# Patient Record
Sex: MALE | Race: WHITE | NOT HISPANIC OR LATINO | Employment: OTHER | URBAN - METROPOLITAN AREA
[De-identification: names, ages, dates, MRNs, and addresses within clinical notes are randomized per-mention and may not be internally consistent; named-entity substitution may affect disease eponyms.]

---

## 2018-04-06 ENCOUNTER — RESOLUTE PROFESSIONAL BILLING HOSPITAL PROF FEE (OUTPATIENT)
Dept: HOSPITALIST | Facility: MEDICAL CENTER | Age: 53
End: 2018-04-06
Payer: COMMERCIAL

## 2018-04-06 ENCOUNTER — APPOINTMENT (OUTPATIENT)
Dept: RADIOLOGY | Facility: MEDICAL CENTER | Age: 53
DRG: 339 | End: 2018-04-06
Attending: EMERGENCY MEDICINE
Payer: COMMERCIAL

## 2018-04-06 ENCOUNTER — HOSPITAL ENCOUNTER (INPATIENT)
Facility: MEDICAL CENTER | Age: 53
LOS: 4 days | DRG: 339 | End: 2018-04-10
Attending: EMERGENCY MEDICINE | Admitting: SURGERY
Payer: COMMERCIAL

## 2018-04-06 DIAGNOSIS — K35.32 ACUTE GANGRENOUS APPENDICITIS WITH PERFORATION AND PERITONITIS: ICD-10-CM

## 2018-04-06 DIAGNOSIS — K35.32 PERFORATED APPENDICITIS: ICD-10-CM

## 2018-04-06 PROBLEM — I48.91 ATRIAL FIBRILLATION WITH RAPID VENTRICULAR RESPONSE (HCC): Status: ACTIVE | Noted: 2018-04-06

## 2018-04-06 PROBLEM — Z86.79 HISTORY OF HYPERTENSION: Status: ACTIVE | Noted: 2018-04-06

## 2018-04-06 LAB
ALBUMIN SERPL BCP-MCNC: 4.2 G/DL (ref 3.2–4.9)
ALBUMIN/GLOB SERPL: 1.4 G/DL
ALP SERPL-CCNC: 81 U/L (ref 30–99)
ALT SERPL-CCNC: 15 U/L (ref 2–50)
ANION GAP SERPL CALC-SCNC: 12 MMOL/L (ref 0–11.9)
ANION GAP SERPL CALC-SCNC: 13 MMOL/L (ref 0–11.9)
APPEARANCE UR: CLEAR
AST SERPL-CCNC: 15 U/L (ref 12–45)
BASOPHILS # BLD AUTO: 0.5 % (ref 0–1.8)
BASOPHILS # BLD: 0.07 K/UL (ref 0–0.12)
BILIRUB SERPL-MCNC: 1.3 MG/DL (ref 0.1–1.5)
BILIRUB UR QL STRIP.AUTO: NEGATIVE
BUN SERPL-MCNC: 15 MG/DL (ref 8–22)
BUN SERPL-MCNC: 16 MG/DL (ref 8–22)
CALCIUM SERPL-MCNC: 7.5 MG/DL (ref 8.5–10.5)
CALCIUM SERPL-MCNC: 8.8 MG/DL (ref 8.5–10.5)
CHLORIDE SERPL-SCNC: 104 MMOL/L (ref 96–112)
CHLORIDE SERPL-SCNC: 99 MMOL/L (ref 96–112)
CO2 SERPL-SCNC: 20 MMOL/L (ref 20–33)
CO2 SERPL-SCNC: 21 MMOL/L (ref 20–33)
COLOR UR: YELLOW
CREAT SERPL-MCNC: 1.18 MG/DL (ref 0.5–1.4)
CREAT SERPL-MCNC: 1.25 MG/DL (ref 0.5–1.4)
EKG IMPRESSION: NORMAL
EOSINOPHIL # BLD AUTO: 0 K/UL (ref 0–0.51)
EOSINOPHIL NFR BLD: 0 % (ref 0–6.9)
ERYTHROCYTE [DISTWIDTH] IN BLOOD BY AUTOMATED COUNT: 36.3 FL (ref 35.9–50)
GLOBULIN SER CALC-MCNC: 3 G/DL (ref 1.9–3.5)
GLUCOSE BLD-MCNC: 173 MG/DL (ref 65–99)
GLUCOSE SERPL-MCNC: 142 MG/DL (ref 65–99)
GLUCOSE SERPL-MCNC: 151 MG/DL (ref 65–99)
GLUCOSE UR STRIP.AUTO-MCNC: NEGATIVE MG/DL
HCT VFR BLD AUTO: 45.4 % (ref 42–52)
HGB BLD-MCNC: 16.3 G/DL (ref 14–18)
IMM GRANULOCYTES # BLD AUTO: 0.09 K/UL (ref 0–0.11)
IMM GRANULOCYTES NFR BLD AUTO: 0.6 % (ref 0–0.9)
KETONES UR STRIP.AUTO-MCNC: 40 MG/DL
LACTATE BLD-SCNC: 1.5 MMOL/L (ref 0.5–2)
LEUKOCYTE ESTERASE UR QL STRIP.AUTO: NEGATIVE
LYMPHOCYTES # BLD AUTO: 0.82 K/UL (ref 1–4.8)
LYMPHOCYTES NFR BLD: 5.8 % (ref 22–41)
MAGNESIUM SERPL-MCNC: 1.7 MG/DL (ref 1.5–2.5)
MCH RBC QN AUTO: 30.4 PG (ref 27–33)
MCHC RBC AUTO-ENTMCNC: 35.9 G/DL (ref 33.7–35.3)
MCV RBC AUTO: 84.7 FL (ref 81.4–97.8)
MICRO URNS: ABNORMAL
MONOCYTES # BLD AUTO: 0.99 K/UL (ref 0–0.85)
MONOCYTES NFR BLD AUTO: 7 % (ref 0–13.4)
NEUTROPHILS # BLD AUTO: 12.24 K/UL (ref 1.82–7.42)
NEUTROPHILS NFR BLD: 86.1 % (ref 44–72)
NITRITE UR QL STRIP.AUTO: NEGATIVE
NRBC # BLD AUTO: 0 K/UL
NRBC BLD-RTO: 0 /100 WBC
PH UR STRIP.AUTO: 5.5 [PH]
PLATELET # BLD AUTO: 240 K/UL (ref 164–446)
PMV BLD AUTO: 8.6 FL (ref 9–12.9)
POTASSIUM SERPL-SCNC: 3.2 MMOL/L (ref 3.6–5.5)
POTASSIUM SERPL-SCNC: 4 MMOL/L (ref 3.6–5.5)
PROT SERPL-MCNC: 7.2 G/DL (ref 6–8.2)
PROT UR QL STRIP: NEGATIVE MG/DL
RBC # BLD AUTO: 5.36 M/UL (ref 4.7–6.1)
RBC UR QL AUTO: NEGATIVE
SODIUM SERPL-SCNC: 133 MMOL/L (ref 135–145)
SODIUM SERPL-SCNC: 136 MMOL/L (ref 135–145)
SP GR UR REFRACTOMETRY: >1.045
TROPONIN I SERPL-MCNC: <0.01 NG/ML (ref 0–0.04)
UROBILINOGEN UR STRIP.AUTO-MCNC: 0.2 MG/DL
WBC # BLD AUTO: 14.2 K/UL (ref 4.8–10.8)

## 2018-04-06 PROCEDURE — 0DTJ0ZZ RESECTION OF APPENDIX, OPEN APPROACH: ICD-10-PCS | Performed by: SURGERY

## 2018-04-06 PROCEDURE — 83735 ASSAY OF MAGNESIUM: CPT

## 2018-04-06 PROCEDURE — 99291 CRITICAL CARE FIRST HOUR: CPT | Performed by: SURGERY

## 2018-04-06 PROCEDURE — 500440 HCHG DRESSING, STERILE ROLL (KERLIX): Performed by: SURGERY

## 2018-04-06 PROCEDURE — 74177 CT ABD & PELVIS W/CONTRAST: CPT

## 2018-04-06 PROCEDURE — 700105 HCHG RX REV CODE 258: Performed by: SURGERY

## 2018-04-06 PROCEDURE — 700111 HCHG RX REV CODE 636 W/ 250 OVERRIDE (IP): Performed by: EMERGENCY MEDICINE

## 2018-04-06 PROCEDURE — 93010 ELECTROCARDIOGRAM REPORT: CPT | Performed by: INTERNAL MEDICINE

## 2018-04-06 PROCEDURE — 700102 HCHG RX REV CODE 250 W/ 637 OVERRIDE(OP): Performed by: SURGERY

## 2018-04-06 PROCEDURE — A9270 NON-COVERED ITEM OR SERVICE: HCPCS | Performed by: SURGERY

## 2018-04-06 PROCEDURE — 160039 HCHG SURGERY MINUTES - EA ADDL 1 MIN LEVEL 3: Performed by: SURGERY

## 2018-04-06 PROCEDURE — 501445 HCHG STAPLER, SKIN DISP: Performed by: SURGERY

## 2018-04-06 PROCEDURE — 71045 X-RAY EXAM CHEST 1 VIEW: CPT

## 2018-04-06 PROCEDURE — 96376 TX/PRO/DX INJ SAME DRUG ADON: CPT

## 2018-04-06 PROCEDURE — 88304 TISSUE EXAM BY PATHOLOGIST: CPT

## 2018-04-06 PROCEDURE — A6402 STERILE GAUZE <= 16 SQ IN: HCPCS | Performed by: SURGERY

## 2018-04-06 PROCEDURE — 700101 HCHG RX REV CODE 250: Performed by: EMERGENCY MEDICINE

## 2018-04-06 PROCEDURE — 302135 SEQUENTIAL COMPRESSION MACHINE: Performed by: SURGERY

## 2018-04-06 PROCEDURE — 0WCP0ZZ EXTIRPATION OF MATTER FROM GASTROINTESTINAL TRACT, OPEN APPROACH: ICD-10-PCS | Performed by: SURGERY

## 2018-04-06 PROCEDURE — 700111 HCHG RX REV CODE 636 W/ 250 OVERRIDE (IP)

## 2018-04-06 PROCEDURE — 87040 BLOOD CULTURE FOR BACTERIA: CPT

## 2018-04-06 PROCEDURE — 96361 HYDRATE IV INFUSION ADD-ON: CPT

## 2018-04-06 PROCEDURE — 36415 COLL VENOUS BLD VENIPUNCTURE: CPT

## 2018-04-06 PROCEDURE — 82962 GLUCOSE BLOOD TEST: CPT

## 2018-04-06 PROCEDURE — 700101 HCHG RX REV CODE 250

## 2018-04-06 PROCEDURE — 700101 HCHG RX REV CODE 250: Performed by: SURGERY

## 2018-04-06 PROCEDURE — 80053 COMPREHEN METABOLIC PANEL: CPT

## 2018-04-06 PROCEDURE — 96374 THER/PROPH/DIAG INJ IV PUSH: CPT

## 2018-04-06 PROCEDURE — 500389 HCHG DRAIN, RESERVOIR SUCT JP 100CC: Performed by: SURGERY

## 2018-04-06 PROCEDURE — 96375 TX/PRO/DX INJ NEW DRUG ADDON: CPT

## 2018-04-06 PROCEDURE — 84484 ASSAY OF TROPONIN QUANT: CPT

## 2018-04-06 PROCEDURE — 770022 HCHG ROOM/CARE - ICU (200)

## 2018-04-06 PROCEDURE — 700111 HCHG RX REV CODE 636 W/ 250 OVERRIDE (IP): Performed by: SURGERY

## 2018-04-06 PROCEDURE — 160048 HCHG OR STATISTICAL LEVEL 1-5: Performed by: SURGERY

## 2018-04-06 PROCEDURE — 85025 COMPLETE CBC W/AUTO DIFF WBC: CPT

## 2018-04-06 PROCEDURE — 160009 HCHG ANES TIME/MIN: Performed by: SURGERY

## 2018-04-06 PROCEDURE — 501838 HCHG SUTURE GENERAL: Performed by: SURGERY

## 2018-04-06 PROCEDURE — 87086 URINE CULTURE/COLONY COUNT: CPT

## 2018-04-06 PROCEDURE — 80048 BASIC METABOLIC PNL TOTAL CA: CPT

## 2018-04-06 PROCEDURE — 83605 ASSAY OF LACTIC ACID: CPT

## 2018-04-06 PROCEDURE — 700117 HCHG RX CONTRAST REV CODE 255: Performed by: EMERGENCY MEDICINE

## 2018-04-06 PROCEDURE — 81003 URINALYSIS AUTO W/O SCOPE: CPT

## 2018-04-06 PROCEDURE — 160028 HCHG SURGERY MINUTES - 1ST 30 MINS LEVEL 3: Performed by: SURGERY

## 2018-04-06 PROCEDURE — 93005 ELECTROCARDIOGRAM TRACING: CPT | Performed by: SURGERY

## 2018-04-06 PROCEDURE — 99291 CRITICAL CARE FIRST HOUR: CPT

## 2018-04-06 PROCEDURE — 160022 HCHG BLOCK: Performed by: SURGERY

## 2018-04-06 PROCEDURE — 700105 HCHG RX REV CODE 258: Performed by: EMERGENCY MEDICINE

## 2018-04-06 RX ORDER — SODIUM CHLORIDE 9 MG/ML
1000 INJECTION, SOLUTION INTRAVENOUS ONCE
Status: COMPLETED | OUTPATIENT
Start: 2018-04-06 | End: 2018-04-06

## 2018-04-06 RX ORDER — DEXTROSE MONOHYDRATE, SODIUM CHLORIDE, AND POTASSIUM CHLORIDE 50; 2.98; 4.5 G/1000ML; G/1000ML; G/1000ML
INJECTION, SOLUTION INTRAVENOUS CONTINUOUS
Status: DISCONTINUED | OUTPATIENT
Start: 2018-04-06 | End: 2018-04-06

## 2018-04-06 RX ORDER — ONDANSETRON 2 MG/ML
4 INJECTION INTRAMUSCULAR; INTRAVENOUS ONCE
Status: COMPLETED | OUTPATIENT
Start: 2018-04-06 | End: 2018-04-06

## 2018-04-06 RX ORDER — OMEPRAZOLE 20 MG/1
20 CAPSULE, DELAYED RELEASE ORAL DAILY
Status: DISCONTINUED | OUTPATIENT
Start: 2018-04-06 | End: 2018-04-10 | Stop reason: HOSPADM

## 2018-04-06 RX ORDER — LORATADINE 10 MG/1
10 TABLET ORAL DAILY
COMMUNITY

## 2018-04-06 RX ORDER — MORPHINE SULFATE 4 MG/ML
4 INJECTION, SOLUTION INTRAMUSCULAR; INTRAVENOUS
Status: DISCONTINUED | OUTPATIENT
Start: 2018-04-06 | End: 2018-04-06

## 2018-04-06 RX ORDER — SODIUM CHLORIDE, SODIUM LACTATE, POTASSIUM CHLORIDE, CALCIUM CHLORIDE 600; 310; 30; 20 MG/100ML; MG/100ML; MG/100ML; MG/100ML
INJECTION, SOLUTION INTRAVENOUS CONTINUOUS
Status: DISCONTINUED | OUTPATIENT
Start: 2018-04-06 | End: 2018-04-08

## 2018-04-06 RX ORDER — ONDANSETRON 2 MG/ML
4 INJECTION INTRAMUSCULAR; INTRAVENOUS EVERY 6 HOURS PRN
Status: DISCONTINUED | OUTPATIENT
Start: 2018-04-06 | End: 2018-04-06

## 2018-04-06 RX ORDER — CEFTRIAXONE 2 G/1
2 INJECTION, POWDER, FOR SOLUTION INTRAMUSCULAR; INTRAVENOUS ONCE
Status: COMPLETED | OUTPATIENT
Start: 2018-04-06 | End: 2018-04-06

## 2018-04-06 RX ORDER — VALSARTAN 80 MG/1
160 TABLET ORAL DAILY
Status: DISCONTINUED | OUTPATIENT
Start: 2018-04-06 | End: 2018-04-10 | Stop reason: HOSPADM

## 2018-04-06 RX ORDER — ONDANSETRON 2 MG/ML
4 INJECTION INTRAMUSCULAR; INTRAVENOUS EVERY 4 HOURS PRN
Status: DISCONTINUED | OUTPATIENT
Start: 2018-04-06 | End: 2018-04-10 | Stop reason: HOSPADM

## 2018-04-06 RX ORDER — ACETAMINOPHEN 650 MG/1
650 SUPPOSITORY RECTAL
Status: DISCONTINUED | OUTPATIENT
Start: 2018-04-06 | End: 2018-04-06

## 2018-04-06 RX ORDER — HYDROMORPHONE HYDROCHLORIDE 2 MG/ML
.5-1 INJECTION, SOLUTION INTRAMUSCULAR; INTRAVENOUS; SUBCUTANEOUS
Status: DISCONTINUED | OUTPATIENT
Start: 2018-04-06 | End: 2018-04-07

## 2018-04-06 RX ORDER — CIPROFLOXACIN 500 MG/1
500 TABLET, FILM COATED ORAL 2 TIMES DAILY
Status: ON HOLD | COMMUNITY
Start: 2018-04-04 | End: 2018-04-10

## 2018-04-06 RX ORDER — CARVEDILOL 12.5 MG/1
6.25 TABLET ORAL 2 TIMES DAILY WITH MEALS
COMMUNITY
End: 2018-05-22

## 2018-04-06 RX ORDER — LABETALOL HYDROCHLORIDE 5 MG/ML
10-20 INJECTION, SOLUTION INTRAVENOUS EVERY 4 HOURS PRN
Status: DISCONTINUED | OUTPATIENT
Start: 2018-04-06 | End: 2018-04-10 | Stop reason: HOSPADM

## 2018-04-06 RX ORDER — OMEPRAZOLE 20 MG/1
20 CAPSULE, DELAYED RELEASE ORAL DAILY
COMMUNITY

## 2018-04-06 RX ORDER — MORPHINE SULFATE 4 MG/ML
4 INJECTION, SOLUTION INTRAMUSCULAR; INTRAVENOUS ONCE
Status: COMPLETED | OUTPATIENT
Start: 2018-04-06 | End: 2018-04-06

## 2018-04-06 RX ORDER — HYDRALAZINE HYDROCHLORIDE 20 MG/ML
10-20 INJECTION INTRAMUSCULAR; INTRAVENOUS EVERY 4 HOURS PRN
Status: DISCONTINUED | OUTPATIENT
Start: 2018-04-06 | End: 2018-04-10 | Stop reason: HOSPADM

## 2018-04-06 RX ORDER — METRONIDAZOLE 500 MG/1
500 TABLET ORAL 3 TIMES DAILY
Status: ON HOLD | COMMUNITY
Start: 2018-04-04 | End: 2018-04-10

## 2018-04-06 RX ORDER — CARVEDILOL 6.25 MG/1
6.25 TABLET ORAL 2 TIMES DAILY WITH MEALS
Status: DISCONTINUED | OUTPATIENT
Start: 2018-04-06 | End: 2018-04-10 | Stop reason: HOSPADM

## 2018-04-06 RX ORDER — DEXTROSE MONOHYDRATE 25 G/50ML
25 INJECTION, SOLUTION INTRAVENOUS
Status: DISCONTINUED | OUTPATIENT
Start: 2018-04-06 | End: 2018-04-10

## 2018-04-06 RX ORDER — VALSARTAN 160 MG/1
160 TABLET ORAL DAILY
COMMUNITY

## 2018-04-06 RX ORDER — MAGNESIUM SULFATE 1 G/100ML
1 INJECTION INTRAVENOUS ONCE
Status: COMPLETED | OUTPATIENT
Start: 2018-04-06 | End: 2018-04-06

## 2018-04-06 RX ORDER — ACETAMINOPHEN 325 MG/1
650 TABLET ORAL 4 TIMES DAILY
Status: DISCONTINUED | OUTPATIENT
Start: 2018-04-06 | End: 2018-04-09

## 2018-04-06 RX ADMIN — ONDANSETRON 4 MG: 2 INJECTION INTRAMUSCULAR; INTRAVENOUS at 06:42

## 2018-04-06 RX ADMIN — MORPHINE SULFATE 4 MG: 4 INJECTION INTRAVENOUS at 08:47

## 2018-04-06 RX ADMIN — METRONIDAZOLE 500 MG: 500 INJECTION, SOLUTION INTRAVENOUS at 16:06

## 2018-04-06 RX ADMIN — ACETAMINOPHEN 650 MG: 325 TABLET, FILM COATED ORAL at 22:53

## 2018-04-06 RX ADMIN — ACETAMINOPHEN 650 MG: 325 TABLET, FILM COATED ORAL at 16:09

## 2018-04-06 RX ADMIN — POTASSIUM CHLORIDE, DEXTROSE MONOHYDRATE AND SODIUM CHLORIDE: 300; 5; 450 INJECTION, SOLUTION INTRAVENOUS at 08:58

## 2018-04-06 RX ADMIN — SODIUM CHLORIDE 1000 ML: 9 INJECTION, SOLUTION INTRAVENOUS at 06:42

## 2018-04-06 RX ADMIN — IOHEXOL 100 ML: 350 INJECTION, SOLUTION INTRAVENOUS at 07:17

## 2018-04-06 RX ADMIN — METRONIDAZOLE 500 MG: 500 INJECTION, SOLUTION INTRAVENOUS at 22:00

## 2018-04-06 RX ADMIN — MAGNESIUM SULFATE HEPTAHYDRATE 1 G: 1 INJECTION, SOLUTION INTRAVENOUS at 18:11

## 2018-04-06 RX ADMIN — MORPHINE SULFATE 4 MG: 4 INJECTION INTRAVENOUS at 06:42

## 2018-04-06 RX ADMIN — CEFTRIAXONE SODIUM 2 G: 2 INJECTION, POWDER, FOR SOLUTION INTRAMUSCULAR; INTRAVENOUS at 07:29

## 2018-04-06 RX ADMIN — SODIUM CHLORIDE, POTASSIUM CHLORIDE, SODIUM LACTATE AND CALCIUM CHLORIDE: 600; 310; 30; 20 INJECTION, SOLUTION INTRAVENOUS at 14:49

## 2018-04-06 RX ADMIN — OMEPRAZOLE 20 MG: 20 CAPSULE, DELAYED RELEASE ORAL at 16:09

## 2018-04-06 RX ADMIN — CARVEDILOL 6.25 MG: 6.25 TABLET, FILM COATED ORAL at 18:00

## 2018-04-06 RX ADMIN — CEFTRIAXONE 2 G: 2 INJECTION, POWDER, FOR SOLUTION INTRAMUSCULAR; INTRAVENOUS at 21:00

## 2018-04-06 ASSESSMENT — LIFESTYLE VARIABLES
TOTAL SCORE: 0
EVER HAD A DRINK FIRST THING IN THE MORNING TO STEADY YOUR NERVES TO GET RID OF A HANGOVER: NO
CONSUMPTION TOTAL: INCOMPLETE
HAVE YOU EVER FELT YOU SHOULD CUT DOWN ON YOUR DRINKING: NO
TOTAL SCORE: 0
TOTAL SCORE: 0
DO YOU DRINK ALCOHOL: YES
HAVE PEOPLE ANNOYED YOU BY CRITICIZING YOUR DRINKING: NO
EVER FELT BAD OR GUILTY ABOUT YOUR DRINKING: NO

## 2018-04-06 ASSESSMENT — PAIN SCALES - GENERAL
PAINLEVEL_OUTOF10: 0

## 2018-04-06 NOTE — ED NOTES
Pt back from Ct. Rehooked to monitor- VSS. Pt medicated as ordered. See MAR for details. Dr. Hein to bedside to update pt on plan of care -- pt to go to OR for confirmed appy on Ct. Pt verbalized understanding. No needs at this time. Family at bedside. Ongoing monitoring.

## 2018-04-06 NOTE — PROGRESS NOTES
2 RN skin assessment. Surgical incision to LLQ abdomen, noted in wound LDA. No other areas of concern.

## 2018-04-06 NOTE — CONSULTS
Reason of Consult: Atrial flutter    Consulting Physician: Dr. Eng    HPI:  52-year-old male with a history of hypertension and hyperlipidemia presented with a ruptured appendix and underwent open appendectomy today. He was in sinus rhythm at arrival according to anesthesiology but developed atrial flutter with rapid ventricular response intraoperatively. The appendectomy was uncomplicated and the patient remains in atrial flutter at a rate of 140 bpm. He is hemodynamically stable and asymptomatic with regard to any cardiac complaints. He does not smoke drinks only occasionally and does not do drugs. He has no family history of coronary artery disease but does have a stepfather with atrial fibrillation so he is familiar with it. Prior to this he was in good health very active with no exertional symptoms.    Denies any other cardiovascular symptoms including chest pain, shortness of breath, dyspnea on exertion, lightheadedness, syncope or presyncope, lower extremity edema, PND, orthopnea or palpitations.      Past Medical History:   Diagnosis Date   • Allergy    • ASTHMA    • GERD (gastroesophageal reflux disease)     no ulcer/ EGD-    • Hyperlipidemia    • Hypertension        Social History     Social History   • Marital status: Single     Spouse name: N/A   • Number of children: N/A   • Years of education: N/A     Occupational History   • Not on file.     Social History Main Topics   • Smoking status: Never Smoker   • Smokeless tobacco: Never Used   • Alcohol use Yes      Comment: occ   • Drug use: No   • Sexual activity: Not on file     Other Topics Concern   • Not on file     Social History Narrative   • No narrative on file       No current facility-administered medications on file prior to encounter.      No current outpatient prescriptions on file prior to encounter.       Current Facility-Administered Medications   Medication Dose Frequency Provider Last Rate Last Dose   • Pharmacy Consult Request ...Pain  "Management Review 1 Each  1 Each PRN Kali Eng M.D.       • LR infusion   Continuous Kali Eng M.D. 150 mL/hr at 04/06/18 1449     • [START ON 4/7/2018] enoxaparin (LOVENOX) inj 40 mg  40 mg DAILY Kali Eng M.D.       • cefTRIAXone (ROCEPHIN) syringe 2 g  2 g QHS Kali Eng M.D.       • metroNIDAZOLE (FLAGYL) IVPB 500 mg  500 mg Q8HRS Kali Eng M.D.       • HYDROmorphone (DILAUDID) injection 0.5-1 mg  0.5-1 mg Q3HRS PRN Kali Eng M.D.       • ondansetron (ZOFRAN) syringe/vial injection 4 mg  4 mg Q4HRS PRN Kali Eng M.D.       • insulin regular (HUMULIN R) injection 1-6 Units  1-6 Units Q6HRS Kali Eng M.D.        And   • glucose 4 g chewable tablet 16 g  16 g Q15 MIN PRN Kali Eng M.D.        And   • dextrose 50% (D50W) injection 25 mL  25 mL Q15 MIN PRN Kali Eng M.D.       • acetaminophen (TYLENOL) tablet 650 mg  650 mg 4X/DAY Kali Eng M.D.       • hydrALAZINE (APRESOLINE) injection 10-20 mg  10-20 mg Q4HRS PRN Kali Eng M.D.       • labetalol (NORMODYNE,TRANDATE) injection 10-20 mg  10-20 mg Q4HRS PRN Kali Eng M.D.       • carvedilol (COREG) tablet 6.25 mg  6.25 mg BID WITH MEALS Kali Eng M.D.       • valsartan (DIOVAN) tablet 160 mg  160 mg DAILY Kali Eng M.D.       • omeprazole (PRILOSEC) capsule 20 mg  20 mg DAILY Kali Eng M.D.         Last reviewed on 4/6/2018  8:12 AM by Alexander Musa    Nkda [no known drug allergy]    History reviewed. No pertinent family history.    ROS: As per HPI all other systems reviewed and negative     Physical Exam   Blood pressure 115/71, pulse (!) 139, temperature 37.9 °C (100.2 °F), resp. rate 18, height 1.88 m (6' 2\"), weight 109 kg (240 lb 4.8 oz), SpO2 96 %.    Constitutional: Appears well-developed.   HENT: Normocephalic and atraumatic. No scleral icterus.   Neck: No JVD present.   Cardiovascular: Normal rate. Exam reveals no gallop " and no friction rub. No murmur heard.   Pulmonary/Chest: CTAB    Abdominal: Soft, nondistended tender to palpation. Hypoactive bowel sounds.   Musculoskeletal:  Pulses present. No atrophy. Strength normal.  Extremities: Exhibits no edema. No clubbing or cyanosis.   Skin: Skin is warm and dry.   Neuro: Non-focal, CN 2-12 intact grossly      Intake/Output Summary (Last 24 hours) at 04/06/18 1455  Last data filed at 04/06/18 1326   Gross per 24 hour   Intake              700 ml   Output               50 ml   Net              650 ml       Recent Labs      04/06/18   0631   WBC  14.2*   RBC  5.36   HEMOGLOBIN  16.3   HEMATOCRIT  45.4   MCV  84.7   MCH  30.4   MCHC  35.9*   RDW  36.3   PLATELETCT  240   MPV  8.6*     Recent Labs      04/06/18   0631   SODIUM  133*   POTASSIUM  3.2*   CHLORIDE  99   CO2  21   GLUCOSE  151*   BUN  16   CREATININE  1.25   CALCIUM  8.8                       EKG (4/6/2018 ):  I have personally reviewed the EKG this visit and discussed with the patient. It shows atrial flutter 130 bpm.    Imaging reviewed    Impressions:  1. Acute appendicitis status post open appendectomy  2. New-onset atrial flutter with rapid ventricular response  3. Systemic hypertension, chronic  4. Hypokalemia    Recommendations:  It is likely that sepsis/appendicitis and hypokalemia contributed to triggering his atrial flutter with an underlying substrate of hypertension. We discussed the options including watchful waiting, cardioversion, a front ablation. I advised upfront ablation as he is otherwise healthy aside from hypertension. He would like to discuss this further and is leaning towards cardioversion for temporary fix rather than permanent cure. This is of course up to his preference. I have provided extensive education at the bedside. Regardless he will need several weeks of oral anticoagulation after ablation or cardioversion and follow-up with electrophysiology as an outpatient. Heart rates in the 140s are  reasonable overnight while he makes his decisions. Nothing by mouth after midnight for possible cardioversion.    1. Nothing by mouth after midnight for cardioversion versus ablation  2. Resume carvedilol 12.5 mg twice a day which is his outpatient medication in preference to his ARB.    Thank you for this interesting consultation. It was my pleasure to see Jesse Norwood today.    Humble Navarro MD, FACC, [facility]  Division of Interventional Cardiology  [facility]    Discussed with the referring physician Dr. Eng and bedside nursing.

## 2018-04-06 NOTE — PROGRESS NOTES
8695 Patient arrival to S120; bedside report received from OR RN and Dr. Yañez. Patient A/Ox4, no complaints of pain at this time. All lines, drains, and airway assessed. Dr. Eng notified of patient arrival.     -140  /73  RR 18  SpO2 96% on 4L O2.

## 2018-04-06 NOTE — ED TRIAGE NOTES
"Jesse OSCAR Norwood  52 y.o. male  Chief Complaint   Patient presents with   • RLQ Pain     \"2 days ago I went to my PCP who thought I was in the early phases of apendicitis, he gave me ABX but nothing helped. I am now having constant pain.\"       Pt amb to triage with steady gait for above complaint. Last drank fluids at 0400, no solid foods for 48 hours. Sepsis 3, charge RN notified.    Pt is alert and oriented, speaking in full sentences, follows commands and responds appropriately to questions. Pt is diaphoretic and is guarding RLQ. Resp are even and unlabored.    Pt placed in lobby. Pt educated on triage process. Pt encouraged to alert staff for any changes.    "

## 2018-04-06 NOTE — ED NOTES
Medication Reconciliation has been completed by interviewing patient with consent to do so with family/visitors in the room.  Patient had medication bottles present (returned)    Patient started 2 antibiotics 4-4-18    Pharmacy:  [facility] [place]

## 2018-04-06 NOTE — PROGRESS NOTES
Surgical Progress Note    Author: Prashant Chase Date & Time created: 2018   10:55 AM     Interval Events:  Seen in ER, diagnosed with acute appendicitis, likely perforated.  Scheduled for open appendectomy.  Risks of procedure, procedure itself discussed with patient.  ROS  Hemodynamics:  Temp (24hrs), Av.2 °C (99 °F), Min:36.6 °C (97.8 °F), Max:37.9 °C (100.2 °F)  Temperature: 37.9 °C (100.2 °F)  Pulse  Av  Min: 96  Max: 114Heart Rate (Monitored): (!) 101  Blood Pressure: 115/71, NIBP: (!) 97/53     Respiratory:    Respiration: 20, Pulse Oximetry: 95 %           Neuro:  GCS       Fluids:  No intake or output data in the 24 hours ending 18 1055  Weight: 109 kg (240 lb 4.8 oz)  Current Diet Order   Procedures   • DIET NPO     Physical Exam   Constitutional: He is oriented to person, place, and time. He appears well-developed.   Eyes: Pupils are equal, round, and reactive to light.   Cardiovascular: Normal rate.    Pulmonary/Chest: Effort normal and breath sounds normal.   Abdominal: Soft. Bowel sounds are normal. He exhibits no distension. There is tenderness.   Tender right lower quadrant   Neurological: He is alert and oriented to person, place, and time.     Labs:  Recent Results (from the past 24 hour(s))   Lactic acid (lactate)    Collection Time: 18  6:31 AM   Result Value Ref Range    Lactic Acid 1.5 0.5 - 2.0 mmol/L   CBC WITH DIFFERENTIAL    Collection Time: 18  6:31 AM   Result Value Ref Range    WBC 14.2 (H) 4.8 - 10.8 K/uL    RBC 5.36 4.70 - 6.10 M/uL    Hemoglobin 16.3 14.0 - 18.0 g/dL    Hematocrit 45.4 42.0 - 52.0 %    MCV 84.7 81.4 - 97.8 fL    MCH 30.4 27.0 - 33.0 pg    MCHC 35.9 (H) 33.7 - 35.3 g/dL    RDW 36.3 35.9 - 50.0 fL    Platelet Count 240 164 - 446 K/uL    MPV 8.6 (L) 9.0 - 12.9 fL    Neutrophils-Polys 86.10 (H) 44.00 - 72.00 %    Lymphocytes 5.80 (L) 22.00 - 41.00 %    Monocytes 7.00 0.00 - 13.40 %    Eosinophils 0.00 0.00 - 6.90 %    Basophils 0.50  0.00 - 1.80 %    Immature Granulocytes 0.60 0.00 - 0.90 %    Nucleated RBC 0.00 /100 WBC    Neutrophils (Absolute) 12.24 (H) 1.82 - 7.42 K/uL    Lymphs (Absolute) 0.82 (L) 1.00 - 4.80 K/uL    Monos (Absolute) 0.99 (H) 0.00 - 0.85 K/uL    Eos (Absolute) 0.00 0.00 - 0.51 K/uL    Baso (Absolute) 0.07 0.00 - 0.12 K/uL    Immature Granulocytes (abs) 0.09 0.00 - 0.11 K/uL    NRBC (Absolute) 0.00 K/uL   COMP METABOLIC PANEL    Collection Time: 04/06/18  6:31 AM   Result Value Ref Range    Sodium 133 (L) 135 - 145 mmol/L    Potassium 3.2 (L) 3.6 - 5.5 mmol/L    Chloride 99 96 - 112 mmol/L    Co2 21 20 - 33 mmol/L    Anion Gap 13.0 (H) 0.0 - 11.9    Glucose 151 (H) 65 - 99 mg/dL    Bun 16 8 - 22 mg/dL    Creatinine 1.25 0.50 - 1.40 mg/dL    Calcium 8.8 8.5 - 10.5 mg/dL    AST(SGOT) 15 12 - 45 U/L    ALT(SGPT) 15 2 - 50 U/L    Alkaline Phosphatase 81 30 - 99 U/L    Total Bilirubin 1.3 0.1 - 1.5 mg/dL    Albumin 4.2 3.2 - 4.9 g/dL    Total Protein 7.2 6.0 - 8.2 g/dL    Globulin 3.0 1.9 - 3.5 g/dL    A-G Ratio 1.4 g/dL   ESTIMATED GFR    Collection Time: 04/06/18  6:31 AM   Result Value Ref Range    GFR If African American >60 >60 mL/min/1.73 m 2    GFR If Non African American >60 >60 mL/min/1.73 m 2   URINALYSIS    Collection Time: 04/06/18  9:00 AM   Result Value Ref Range    Micro Urine Req see below     Color Yellow     Character Clear     Ph 5.5 5.0 - 8.0    Glucose Negative Negative mg/dL    Ketones 40 (A) Negative mg/dL    Protein Negative Negative mg/dL    Bilirubin Negative Negative    Urobilinogen, Urine 0.2 Negative    Nitrite Negative Negative    Leukocyte Esterase Negative Negative    Occult Blood Negative Negative   REFRACTOMETER SG    Collection Time: 04/06/18  9:00 AM   Result Value Ref Range    Specific Gravity >1.045      Medical Decision Making, by Problem:  Acute appendicitis  HTN  GERD  Hyperlipidemia    Plan:  To OR now for appendectomy    Quality Measures:  Quality-Core Measures    Discussed patient  condition with Patient

## 2018-04-06 NOTE — OR SURGEON
Immediate Post OP Note    PreOp Diagnosis: Appendicitis    PostOp Diagnosis: 1.  Appendicitis  2.  New onset A-flutter vs A-fib    Procedure(s):  OPEN APPENDECTOMY - Wound Class: Dirty    Surgeon(s):  Prashant Chase M.D.    Anesthesiologist/Type of Anesthesia:  Anesthesiologist: Vidya Yañez M.D./General    Surgical Staff:  Circulator: Pat Enriquez R.N.; Omari Martinez R.N.  Scrub Person: Clifford Adkins R.N.; Estee Gates    Specimens removed if any:  Appendix    Estimated Blood Loss: 30 ml    Findings: Completely necrotic appendix and periappendiceal tissue.  2 Fecalliths in RLQ.  Appendix disconnected from cecum at base of appendix    Complications: New onset A - flutter        4/6/2018 1:29 PM Prashant Chase M.D.

## 2018-04-06 NOTE — OP REPORT
DATE OF SERVICE:  04/06/2018    PREOPERATIVE DIAGNOSIS:  Acute appendicitis.    POSTOPERATIVE DIAGNOSES:  1.  Acute gangrenous appendicitis.  2.  New onset atrial flutter versus atrial fibrillation.    INDICATION FOR SURGERY:  This is a 52-year-old male with a 5-day history of   abdominal pain who was seen in the ER today and noted to have a CT scan   consistent with perforated appendicitis and he was brought to the operating   room with a plan to undergo an open appendectomy.    PROCEDURE:  Open appendectomy.    SURGEON:  Prashant Chase MD    ASSISTANT:  None.    ANESTHESIOLOGIST:  Vidya Yañez MD    ANESTHESIA:  General endotracheal anesthesia.    FINDINGS:  Completely necrotic appendix and periappendiceal tissue.  Two   fecaliths were found outside of the appendix in the right lower quadrant.  The   appendix was disconnected from the cecum at the base of the appendix.    PROCEDURE NARRATIVE:  Signed informed consent was on the chart at the time of   the procedure.  The patient was brought to the operating room and placed in   the supine position.  General endotracheal anesthesia was induced.  The skin   over the abdomen was prepped and draped in a sterile fashion.  The patient was   administered 2 grams of Ancef in addition to the Rocephin that he was given   in the ER at 0729 this morning.  A timeout was performed.  Using a 10 blade   scalpel, a 10 cm horizontal incision was made over McBurney's point.  The   underlying soft tissue was dissected through using careful Bovie cautery.  The   external oblique fascia was visualized and this was incised in the direction   of its fibers.  The underlying muscle was spread in a muscle sparing fashion   and the posterior fascia was incised in the direction of its fibers.  The   peritoneum was lifted and incised with Metzenbaum scissors.  There was   immediate release of clear fluid.  The hole was enlarged by placing   appendiceal retractors within the defect  and stretching the hole.  The   appendix was immediately evident.  This was completely necrotic and very firm.    The mesoappendix was also noted to be quite necrotic to the point that it   was unclear exactly where the mesoappendix started and the appendix has   stopped.  The appendix was dissected down away from the surrounding soft   tissue with the mesoappendix and eventually I was able to get to the base of   the appendix, which had been disconnected from the cecum.  Two fecaliths were   found and removed.  The mesoappendix was amputated and the appendix was   removed.  The defect in the cecum was closed with a 2-0 Vicryl figure-of-eight   suture and then reinforced with a Z-plasty suture over the top.  The   appendiceal artery was dressed with a 2-0 Vicryl suture as well.  The right   lower quadrant was irrigated and dried with 1 L of warm normal saline.  This   irrigation also extended into the pelvis.  No evidence of undrained abscess   was appreciated.  Hemostasis was noted to be adequate.  During the case, the   patient was noted to have gone into atrial flutter and had a rapid response,   which was poorly controllable.  The anesthesiologist did call the cardiologist   to see the patient in the PACU and plan was made for the patient to go to the   ICU.  A 10-Argentine flat fluted drain was inserted in the right lower quadrant   abdominal wall to drain the pericecal area.  This was secured in place with a   2-0 nylon suture.  The peritoneum was closed with a 2-0 running Vicryl suture.    The posterior fascia was closed with an 0 Vicryl running suture and the   anterior fascia was closed with an 0 Vicryl running suture.  The subcutaneous   tissue was irrigated and dried.  Hemostasis was noted to be adequate.  The   subcutaneous tissue was packed with Kerlix, which had been moistened with   normal saline and run almost completely dry.  This was covered by dry 4x4s and   held in place with tape.  The drain exit  site was dressed with a drain   sponge, held in place with tape.    FLUIDS:  1000 mL crystalloid.    ESTIMATED BLOOD LOSS:  30 mL.    DRAINS:  A 10-Bulgarian flat fluted drain to the periappendiceal area.    SPECIMENS:  Appendix to pathology.    COMPLICATIONS:  Atrial flutter.    DISPOSITION:  Patient was in serious condition in the postanesthesia care   unit.       ____________________________________     MD JOAQUÍN Hall / ARIK    DD:  04/06/2018 13:38:34  DT:  04/06/2018 15:39:43    D#:  [id number]  Job#:  [id number]

## 2018-04-06 NOTE — H&P
DATE OF ADMISSION:  04/06/2018    CHIEF COMPLAINT:  Abdominal pain.    HISTORY OF PRESENT ILLNESS:  The patient is a 52-year-old male who had been   traveling out of the country until last weekend on Sunday began experiencing   abdominal discomfort, diarrhea, and fevers.  He was seen by his primary care   physician, Dr. Elizalde, on Wednesday and states that an ultrasound was done   in that office, which showed no evidence of appendicitis.  He was diagnosed   with gastroenteritis and started on ciprofloxacin and metronidazole.  His pain   worsened yesterday and this morning became severe and localized in the right   lower quadrant.  He had not had a fever for a number of days and had one this   morning and so presented to the ER where a CT scan showed evidence of   perforated appendicitis with a significant amount of fluid in his right lower   quadrant and a surgical consultation was obtained.  He states that his   diarrhea has resolved.  He has had nausea and vomiting.  He continues to pass   flatus and has had hard stools last 2 days.    PAST MEDICAL HISTORY:  1.  Hypertension.  2.  Gastroesophageal reflux disease.  3.  Hyperlipidemia.    MEDICATIONS:  Aspirin 81 mg p.o. daily, carvedilol 6.25 mg p.o. b.i.d.,   Claritin 10 mg p.o. daily, valsartan 160 mg p.o. daily, omeprazole 20 mg p.o.   daily, ciprofloxacin 500 mg p.o. b.i.d. for 3 days, and Flagyl 500 mg p.o.   t.i.d. for 3 days.    ALLERGIES:  No known drug allergies.    PAST SURGICAL HISTORY:  He has had a left Achilles tendon repair 15-20 years   ago.  He has undergone a colonoscopy last year.    SOCIAL HISTORY:  He is an infrequent alcohol user, denies tobacco or drug use.    He is in the finance business.    FAMILY HISTORY:  Noncontributory.    REVIEW OF SYSTEMS:  A 16-point review of systems is negative except as noted   in the HPI.    PHYSICAL EXAMINATION:  VITAL SIGNS:  Temperature at 0601 this morning was 36.6, respiration rate at   0700 this morning  was 18, blood pressure currently 97/53, heart rate 99, and   O2 saturation 93% on 2 liters nasal cannula.  GENERAL:  He is an age appropriate looking male in mild acute distress.  HEENT:  Pupils equal, round, and reactive to light.  No scleral icterus.    Extraocular movement is intact.  He has moist oral mucosa.  NECK:  Supple.  No JVD, no lymphadenopathy, no thyromegaly.  LUNGS:  Clear to auscultation bilaterally.  HEART:  Has a regular rate and rhythm, no murmurs, gallops, or rubs.  ABDOMEN:  Nondistended, has positive bowel sounds, soft, and is exquisitely   tender in the right lower quadrant.  He has no umbilical or inguinal hernias   appreciated.  RECTAL:  Deferred.  PELVIC:  Deferred.  EXTREMITIES:  2+ pulses in all 4 extremities.  No clubbing, cyanosis, or   edema.  NEUROLOGIC:  Cranial nerves II-XII are grossly intact.  He has no focal   deficits and his gait is not examined.    LABORATORY DATA:  CBC is remarkable for an elevated white blood cell count at   14,200 with an elevated neutrophil percentage of 86%.  A comprehensive   metabolic panel is remarkable for a low sodium at 133, low potassium at 3.2,   and elevated glucose at 151.  His LFTs and lactic acid are within normal   limits.  A urinalysis is remarkable for an elevated ketones and a specific   gravity of 1.045.  CT scan of the abdomen and pelvis performed this morning at   0716 shows enlarged appendix in the right lower quadrant with air within the   appendiceal wall, multiple enlarge right lower quadrant lymph nodes seen.    There is also noted to be hazy fat stranding and fluid in the right lower   quadrant.  These changes are noted to be compatible with gangrenous   appendicitis with perforation.  Chest x-ray performed today shows no acute   cardiopulmonary disease.    ASSESSMENT:  A 52-year-old male with:  1.  Acute appendicitis, likely perforated with fluid collection around.  2.  Hypertension.  3.  Gastroesophageal reflux disease.  4.   Hyperlipidemia.    At this time, I have recommended that this patient be taken to the operating   room and undergo an open appendectomy.  We did discuss the operation itself   the fact that he would likely be left with the skin opened and need to have   either moist to dry dressings or wound VAC placed in the future.  We discussed   the risks of the operation, which include spread of infection,   intraperitoneal abscess in the future, bleeding, damage to the bowel, and need   for reoperation.  He states he understands those risks and wishes to proceed.    PLAN:  He will be kept n.p.o. and taken to the operating room now for an open   appendectomy and other procedures as indicated.       ____________________________________     MD JOAQUÍN Hall / ARIK    DD:  04/06/2018 10:38:10  DT:  04/06/2018 11:32:18    D#:  [id number]  Job#:  [id number]

## 2018-04-06 NOTE — ED NOTES
Pt medicated additionally for pain as ordered. See MAR for details. VSS on monitor. Awaiting Or/admit. Pt aware of plan of care. Fall precautions in place. Call bell in reach. Ongoing monitoring.

## 2018-04-06 NOTE — ED NOTES
Pt brought back to rm RD 05 from triage. Agree with triage assessment. Pt able to transfer self to bed, pt in gown, on monitor, family at bedside, call light in reach. Chart up for ERP. PIV line established. Blood drawn and sent to the lab. X-ray at bedside.

## 2018-04-06 NOTE — PROGRESS NOTES
"  Trauma/Surgical Progress Note      Interval Events:  New admit post-op for atrial fibrillation with RVR and hypotension  EKG reviewed  Discussed with Cardiology and operative surgeon  ICU monitoring needed.    Hemodynamics:  Blood pressure 115/71, pulse (!) 139, temperature 37.9 °C (100.2 °F), resp. rate 18, height 1.88 m (6' 2\"), weight 109 kg (240 lb 4.8 oz), SpO2 96 %.     Respiratory:    Respiration: 18, Pulse Oximetry: 96 %           Fluids:    Intake/Output Summary (Last 24 hours) at 04/06/18 1432  Last data filed at 04/06/18 1326   Gross per 24 hour   Intake              700 ml   Output               50 ml   Net              650 ml     Admit Weight: 109 kg (240 lb 4.8 oz)  Current Weight: 109 kg (240 lb 4.8 oz)    Physical Exam   Constitutional: He appears well-developed and well-nourished. He appears lethargic. He is easily aroused. No distress. Nasal cannula in place.   Cardiovascular: Intact distal pulses.  An irregular rhythm present. Tachycardia present.    Pulmonary/Chest: Effort normal and breath sounds normal. No accessory muscle usage. No respiratory distress. He has no decreased breath sounds.   Abdominal: He exhibits distension. There is generalized tenderness.   CLAUDE with bloody drainage   Neurological: He is easily aroused. He appears lethargic. No cranial nerve deficit or sensory deficit. GCS eye subscore is 4. GCS verbal subscore is 5. GCS motor subscore is 6.   Nursing note and vitals reviewed.      Medical Decision Making/Problem List:    Active Hospital Problems    Diagnosis   • Acute gangrenous appendicitis with perforation and peritonitis [K35.3]     Priority: High     Necrotic with stool emanating from gangrenous appendiceal orifice  4/6 - Open appendectomy, oversewn, drain placement  C3/Flagyl post-op  Prashant Chase MD     • Atrial fibrillation with rapid ventricular response (CMS-HCC) [I48.91]     Priority: High     Noted intra-op with rate up to 180s per report.  Carl - " Cardiology       • History of hypertension [Z86.79]     Priority: Low     Holding outpatient regimen in the immediate post-op period  PRN IV anti-hypertensives ordered       Core Measures & Quality Metrics:  Radiology images reviewed, Labs reviewed and Medications reviewed  Yang catheter: No Yang      DVT Prophylaxis: Enoxaparin (Lovenox)  DVT prophylaxis - mechanical: SCDs  Ulcer prophylaxis: Yes  Antibiotics: Treating active infection/contamination beyond 24 hours perioperative coverage      DAYNA Score    I independently reviewed pertinent clinical lab tests since admission and ordered additional follow up clinical lab tests.  I independently reviewed pertinent radiographic images and the radiologist's reports since admission and ordered additional follow up radiographic studies.  I reviewed the details of the available patient records and documentation by consulting physicians in [facility] up to today, summated the information, and utilized the information as warranted in today's medical decision making for this patient.    Severe cardiac dysrhythmia with severe exacerbation and progression involving high complexity decision making initiated in an urgent manner by assessing, manipulating, and supporting circulatory function given the high probability of further deterioration in the patient's condition and threat to life.    Aggregated critical care time spent evaluating, reviewing documentation, providing care, and managing this patient exclusive of procedures: 35 minutes    Cesar Eng MD    DATE OF SERVICE: 4/6/2018

## 2018-04-06 NOTE — ED PROVIDER NOTES
"ED Provider Note    ER PROVIDER NOTE        CHIEF COMPLAINT  Chief Complaint   Patient presents with   • RLQ Pain     \"2 days ago I went to my PCP who thought I was in the early phases of apendicitis, he gave me ABX but nothing helped. I am now having constant pain.\"       HPI  Jesse Norwood is a 52 y.o. male who presents to the emergency department complaining of right lower quadrant pain. Patient reports he returned from a trip from [place], around 3 days ago he developed some nausea vomiting and diarrhea. As well as vague abdominal pain. He saw his primary care doctor, was started on Cipro and Flagyl, apparently had an unremarkable ultrasound as well.  His symptoms have persisted and pain has become more intense and right sided. Sharp, worse with movement, no significant radiation. He is still having nausea but no vomiting. No longer having diarrhea.  He had a fever 2 days ago although none currently.     No prior abdominal surgeries, history of hypertension    REVIEW OF SYSTEMS  Pertinent positives include abdominal pain. Pertinent negatives include no chest pain. See HPI for details. All other systems reviewed and are negative.    PAST MEDICAL HISTORY   has a past medical history of Allergy; ASTHMA; GERD (gastroesophageal reflux disease); Hyperlipidemia; and Hypertension.    SURGICAL HISTORY   has a past surgical history that includes other orthopedic surgery.    FAMILY HISTORY  History reviewed. No pertinent family history.    SOCIAL HISTORY  Social History     Social History   • Marital status: Single     Spouse name: N/A   • Number of children: N/A   • Years of education: N/A     Social History Main Topics   • Smoking status: Never Smoker   • Smokeless tobacco: Never Used   • Alcohol use Yes      Comment: occ   • Drug use: No   • Sexual activity: Not on file     Other Topics Concern   • Not on file     Social History Narrative   • No narrative on file      History   Drug Use No       CURRENT MEDICATIONS  Home " "Medications     Reviewed by Alexander Musa (Pharmacy Tech) on 04/06/18 at 0812  Med List Status: Complete   Medication Last Dose Status   aspirin EC (ECOTRIN) 81 MG Tablet Delayed Response 4/5/2018 Active   carvedilol (COREG) 12.5 MG Tab 4/5/2018 Active   ciprofloxacin (CIPRO) 500 MG Tab 4/5/2018 Active   loratadine (CLARITIN) 10 MG Tab 4/5/2018 Active   metroNIDAZOLE (FLAGYL) 500 MG Tab 4/5/2018 Active   omeprazole (PRILOSEC) 20 MG delayed-release capsule 4/5/2018 Active   valsartan (DIOVAN) 160 MG Tab 4/5/2018 Active                ALLERGIES  Allergies   Allergen Reactions   • Nkda [No Known Drug Allergy]        PHYSICAL EXAM  VITAL SIGNS: /77   Pulse 96   Temp 36.6 °C (97.8 °F)   Resp 18   Ht 1.88 m (6' 2\")   Wt 109 kg (240 lb 4.8 oz)   SpO2 97%   BMI 30.85 kg/m²   Pulse ox interpretation: I interpret this pulse ox as normal.    Constitutional: Alert uncomfortable  HENT: No signs of trauma, Bilateral external ears normal, Nose normal. Mucous membranes dry  Eyes: Pupils are equal and reactive, Conjunctiva normal, Non-icteric.   Neck: Normal range of motion, No tenderness, Supple, No stridor.   Lymphatic: No lymphadenopathy noted.   Cardiovascular: Tachycardic, no murmurs.   Thorax & Lungs: Normal breath sounds, No respiratory distress, No wheezing, No chest tenderness.   Abdomen: Bowel sounds normal, Soft, right lower quadrant tenderness with guarding, no rebound, No masses, No pulsatile masses. No peritoneal signs.  Skin: Warm, Dry, No erythema, No rash.   Back: No bony tenderness, No CVA tenderness.   Extremities: Intact distal pulses, No edema, No tenderness, No cyanosis,Negative Tomás's sign.  Musculoskeletal: Good range of motion in all major joints. No tenderness to palpation or major deformities noted.   Neurologic: Alert , Normal motor function, Normal sensory function, No focal deficits noted.   Psychiatric: Affect normal, Judgment normal, Mood normal.     DIAGNOSTIC STUDIES / " "PROCEDURES        LABS  Labs Reviewed   CBC WITH DIFFERENTIAL - Abnormal; Notable for the following:        Result Value    WBC 14.2 (*)     MCHC 35.9 (*)     MPV 8.6 (*)     Neutrophils-Polys 86.10 (*)     Lymphocytes 5.80 (*)     Neutrophils (Absolute) 12.24 (*)     Lymphs (Absolute) 0.82 (*)     Monos (Absolute) 0.99 (*)     All other components within normal limits   COMP METABOLIC PANEL - Abnormal; Notable for the following:     Sodium 133 (*)     Potassium 3.2 (*)     Anion Gap 13.0 (*)     Glucose 151 (*)     All other components within normal limits   LACTIC ACID   URINALYSIS    Narrative:     Indication for culture:->Emergency Room Patient   URINE CULTURE(NEW)    Narrative:     Indication for culture:->Emergency Room Patient   BLOOD CULTURE    Narrative:     Per Hospital Policy: Only change Specimen Src: to \"Line\" if  specified by physician order.   BLOOD CULTURE    Narrative:     Per Hospital Policy: Only change Specimen Src: to \"Line\" if  specified by physician order.   ESTIMATED GFR   LACTIC ACID       All labs reviewed by me.    RADIOLOGY  CT-ABDOMEN-PELVIS WITH   Final Result         1.  Changes compatible with gangrenous appendicitis with perforation.   2.  Hepatomegaly and diffuse hepatic steatosis      These findings were discussed with the patient's clinician, SADI BRYANT, on 4/6/2018 7:28 AM.      DX-CHEST-PORTABLE (1 VIEW)   Final Result         1.  No acute cardiopulmonary disease.        The radiologist's interpretation of all radiological studies have been reviewed by me.    COURSE & MEDICAL DECISION MAKING  Nursing notes, VS, PMSFHx reviewed in chart.    6:29 AM Patient seen and examined at bedside. Patient will be treated with Zofran, morphine, IV fluids as he does appear dehydrated with his dry mucous membranes, tachycardia. Ordered for labs, CT to evaluate his symptoms.     6:59 AM  Patient's white blood cell count has returned elevated, ceftriaxone ordered    7:35 AM  Patient " reevaluated, updated on results of CT. [facility] general surgery    Discussed case with Dr. Chase, pt will be admitted for surgery this morning      8:45 AM patient reevaluated, pain is returning, additional pain medication ordered  Decision Making:  This is a 52 y.o. male presenting with abdominal pain as well as nausea, as well as fever at home. He does have evidence of perforated appendicitis you with gangrenous appendix.  He is admitted for surgery as above. Additionally does have a leukocytosis, and with his tachycardia infection does meet sepsis criteria and has been given appropriate fluids as well as Rocephin for this. Has no lactic acidosis or hypotension suggestive of severe sepsis at this time    Patient admitted in stable condition    FINAL IMPRESSION  1. Perforated appendicitis         The note accurately reflects work and decisions made by me.  Omari Hein  4/6/2018  9:16 AM

## 2018-04-07 LAB
ANION GAP SERPL CALC-SCNC: 9 MMOL/L (ref 0–11.9)
BASOPHILS # BLD AUTO: 0.3 % (ref 0–1.8)
BASOPHILS # BLD: 0.06 K/UL (ref 0–0.12)
BUN SERPL-MCNC: 26 MG/DL (ref 8–22)
CALCIUM SERPL-MCNC: 8.3 MG/DL (ref 8.5–10.5)
CHLORIDE SERPL-SCNC: 101 MMOL/L (ref 96–112)
CO2 SERPL-SCNC: 25 MMOL/L (ref 20–33)
CREAT SERPL-MCNC: 1.3 MG/DL (ref 0.5–1.4)
EOSINOPHIL # BLD AUTO: 0 K/UL (ref 0–0.51)
EOSINOPHIL NFR BLD: 0 % (ref 0–6.9)
ERYTHROCYTE [DISTWIDTH] IN BLOOD BY AUTOMATED COUNT: 40.2 FL (ref 35.9–50)
GLUCOSE BLD-MCNC: 126 MG/DL (ref 65–99)
GLUCOSE BLD-MCNC: 150 MG/DL (ref 65–99)
GLUCOSE BLD-MCNC: 154 MG/DL (ref 65–99)
GLUCOSE BLD-MCNC: 175 MG/DL (ref 65–99)
GLUCOSE SERPL-MCNC: 128 MG/DL (ref 65–99)
HCT VFR BLD AUTO: 40 % (ref 42–52)
HGB BLD-MCNC: 14 G/DL (ref 14–18)
IMM GRANULOCYTES # BLD AUTO: 0.12 K/UL (ref 0–0.11)
IMM GRANULOCYTES NFR BLD AUTO: 0.7 % (ref 0–0.9)
LV EJECT FRACT  99904: 70
LV EJECT FRACT MOD 2C 99903: 68.66
LV EJECT FRACT MOD 4C 99902: 69.62
LV EJECT FRACT MOD BP 99901: 69.56
LYMPHOCYTES # BLD AUTO: 0.7 K/UL (ref 1–4.8)
LYMPHOCYTES NFR BLD: 3.8 % (ref 22–41)
MAGNESIUM SERPL-MCNC: 2.3 MG/DL (ref 1.5–2.5)
MCH RBC QN AUTO: 30.8 PG (ref 27–33)
MCHC RBC AUTO-ENTMCNC: 35 G/DL (ref 33.7–35.3)
MCV RBC AUTO: 87.9 FL (ref 81.4–97.8)
MONOCYTES # BLD AUTO: 0.86 K/UL (ref 0–0.85)
MONOCYTES NFR BLD AUTO: 4.7 % (ref 0–13.4)
NEUTROPHILS # BLD AUTO: 16.6 K/UL (ref 1.82–7.42)
NEUTROPHILS NFR BLD: 90.5 % (ref 44–72)
NRBC # BLD AUTO: 0 K/UL
NRBC BLD-RTO: 0 /100 WBC
PLATELET # BLD AUTO: 227 K/UL (ref 164–446)
PMV BLD AUTO: 9 FL (ref 9–12.9)
POTASSIUM SERPL-SCNC: 4.2 MMOL/L (ref 3.6–5.5)
RBC # BLD AUTO: 4.55 M/UL (ref 4.7–6.1)
SODIUM SERPL-SCNC: 135 MMOL/L (ref 135–145)
WBC # BLD AUTO: 18.3 K/UL (ref 4.8–10.8)

## 2018-04-07 PROCEDURE — 700102 HCHG RX REV CODE 250 W/ 637 OVERRIDE(OP): Performed by: SURGERY

## 2018-04-07 PROCEDURE — 93306 TTE W/DOPPLER COMPLETE: CPT | Mod: 26 | Performed by: INTERNAL MEDICINE

## 2018-04-07 PROCEDURE — 83735 ASSAY OF MAGNESIUM: CPT

## 2018-04-07 PROCEDURE — 80048 BASIC METABOLIC PNL TOTAL CA: CPT

## 2018-04-07 PROCEDURE — 700102 HCHG RX REV CODE 250 W/ 637 OVERRIDE(OP): Performed by: NURSE PRACTITIONER

## 2018-04-07 PROCEDURE — 93306 TTE W/DOPPLER COMPLETE: CPT

## 2018-04-07 PROCEDURE — A9270 NON-COVERED ITEM OR SERVICE: HCPCS | Performed by: SURGERY

## 2018-04-07 PROCEDURE — 700101 HCHG RX REV CODE 250: Performed by: SURGERY

## 2018-04-07 PROCEDURE — 99233 SBSQ HOSP IP/OBS HIGH 50: CPT | Performed by: SURGERY

## 2018-04-07 PROCEDURE — 770020 HCHG ROOM/CARE - TELE (206)

## 2018-04-07 PROCEDURE — A9270 NON-COVERED ITEM OR SERVICE: HCPCS | Performed by: NURSE PRACTITIONER

## 2018-04-07 PROCEDURE — 700105 HCHG RX REV CODE 258: Performed by: SURGERY

## 2018-04-07 PROCEDURE — 85025 COMPLETE CBC W/AUTO DIFF WBC: CPT

## 2018-04-07 PROCEDURE — 82962 GLUCOSE BLOOD TEST: CPT

## 2018-04-07 PROCEDURE — 700105 HCHG RX REV CODE 258

## 2018-04-07 PROCEDURE — 700111 HCHG RX REV CODE 636 W/ 250 OVERRIDE (IP): Performed by: SURGERY

## 2018-04-07 RX ORDER — OXYCODONE HYDROCHLORIDE AND ACETAMINOPHEN 5; 325 MG/1; MG/1
1-2 TABLET ORAL EVERY 4 HOURS PRN
Status: DISCONTINUED | OUTPATIENT
Start: 2018-04-07 | End: 2018-04-10 | Stop reason: HOSPADM

## 2018-04-07 RX ORDER — SODIUM CHLORIDE 9 MG/ML
INJECTION, SOLUTION INTRAVENOUS
Status: COMPLETED
Start: 2018-04-07 | End: 2018-04-07

## 2018-04-07 RX ADMIN — ACETAMINOPHEN 650 MG: 325 TABLET, FILM COATED ORAL at 21:58

## 2018-04-07 RX ADMIN — ASPIRIN 81 MG: 81 TABLET, COATED ORAL at 13:14

## 2018-04-07 RX ADMIN — CARVEDILOL 6.25 MG: 6.25 TABLET, FILM COATED ORAL at 17:25

## 2018-04-07 RX ADMIN — OMEPRAZOLE 20 MG: 20 CAPSULE, DELAYED RELEASE ORAL at 08:23

## 2018-04-07 RX ADMIN — ACETAMINOPHEN 650 MG: 325 TABLET, FILM COATED ORAL at 17:25

## 2018-04-07 RX ADMIN — SODIUM CHLORIDE 500 ML: 9 INJECTION, SOLUTION INTRAVENOUS at 18:51

## 2018-04-07 RX ADMIN — METRONIDAZOLE 500 MG: 500 INJECTION, SOLUTION INTRAVENOUS at 15:20

## 2018-04-07 RX ADMIN — METRONIDAZOLE 500 MG: 500 INJECTION, SOLUTION INTRAVENOUS at 21:58

## 2018-04-07 RX ADMIN — ENOXAPARIN SODIUM 40 MG: 100 INJECTION SUBCUTANEOUS at 08:23

## 2018-04-07 RX ADMIN — ACETAMINOPHEN 650 MG: 325 TABLET, FILM COATED ORAL at 08:23

## 2018-04-07 RX ADMIN — INSULIN HUMAN 1 UNITS: 100 INJECTION, SOLUTION PARENTERAL at 01:19

## 2018-04-07 RX ADMIN — SODIUM CHLORIDE, POTASSIUM CHLORIDE, SODIUM LACTATE AND CALCIUM CHLORIDE: 600; 310; 30; 20 INJECTION, SOLUTION INTRAVENOUS at 18:55

## 2018-04-07 RX ADMIN — ACETAMINOPHEN 650 MG: 325 TABLET, FILM COATED ORAL at 13:14

## 2018-04-07 RX ADMIN — INSULIN HUMAN 1 UNITS: 100 INJECTION, SOLUTION PARENTERAL at 13:15

## 2018-04-07 RX ADMIN — CEFTRIAXONE 2 G: 2 INJECTION, POWDER, FOR SOLUTION INTRAMUSCULAR; INTRAVENOUS at 22:36

## 2018-04-07 RX ADMIN — CARVEDILOL 6.25 MG: 6.25 TABLET, FILM COATED ORAL at 08:23

## 2018-04-07 RX ADMIN — METRONIDAZOLE 500 MG: 500 INJECTION, SOLUTION INTRAVENOUS at 06:21

## 2018-04-07 ASSESSMENT — PAIN SCALES - GENERAL
PAINLEVEL_OUTOF10: 0
PAINLEVEL_OUTOF10: 1
PAINLEVEL_OUTOF10: 0
PAINLEVEL_OUTOF10: 5
PAINLEVEL_OUTOF10: 1
PAINLEVEL_OUTOF10: 2
PAINLEVEL_OUTOF10: 1
PAINLEVEL_OUTOF10: 0

## 2018-04-07 ASSESSMENT — ENCOUNTER SYMPTOMS
CARDIOVASCULAR NEGATIVE: 1
DIZZINESS: 0
NAUSEA: 0
VOMITING: 0
ABDOMINAL PAIN: 1
SPEECH CHANGE: 0
CONSTITUTIONAL NEGATIVE: 1
RESPIRATORY NEGATIVE: 1
ROS GI COMMENTS: BM PRIOR TO ARRIVAL, NO FLATUS

## 2018-04-07 ASSESSMENT — LIFESTYLE VARIABLES: EVER_SMOKED: NEVER

## 2018-04-07 NOTE — CARE PLAN
Problem: Venous Thromboembolism (VTW)/Deep Vein Thrombosis (DVT) Prevention:  Goal: Patient will participate in Venous Thrombosis (VTE)/Deep Vein Thrombosis (DVT)Prevention Measures  SCDs in place, LMWH administered per MAR.     Problem: Pain Management  Goal: Pain level will decrease to patient's comfort goal  Assessing pain q2hr and PRN; complains of aching pain to surgical site, states no additional medication necessary at this time. Able to ambulate around room and reposition self in bed.

## 2018-04-07 NOTE — PROGRESS NOTES
"  Trauma/Surgical Progress Note    Author: Marylou Chung Date & Time created: 4/7/2018   11:37 AM     Interval Events:  POD 1 Open appendectomy  A fib resolved at this point  Adequate pain control, tolerating clears  CLAUDE with large serosang output    -Antibiotic day 2 of 4  -Continue CLAUDE drain  -ASA 81 mg started  -Echo pending  -Medically stable for transfer to [facility] with telemonitoring    Review of Systems   Constitutional: Negative.    Respiratory: Negative.    Cardiovascular: Negative.    Gastrointestinal: Positive for abdominal pain (incisional). Negative for nausea and vomiting.        BM prior to arrival, no flatus   Genitourinary: Negative.         Voiding   Neurological: Negative for dizziness and speech change.     Hemodynamics:  Blood pressure 115/71, pulse 74, temperature 36.7 °C (98.1 °F), resp. rate 20, height 1.88 m (6' 2\"), weight 111.2 kg (245 lb 2.4 oz), SpO2 93 %.     Respiratory:    Respiration: 20, Pulse Oximetry: 93 %           Fluids:    Intake/Output Summary (Last 24 hours) at 04/07/18 1137  Last data filed at 04/07/18 1000   Gross per 24 hour   Intake             2820 ml   Output              650 ml   Net             2170 ml     Admit Weight: 109 kg (240 lb 4.8 oz)  Current Weight: 111.2 kg (245 lb 2.4 oz)    Physical Exam   Constitutional: He is oriented to person, place, and time. He appears well-developed. He is easily aroused. No distress.   Neck: Neck supple. No JVD present. No tracheal deviation present.   Cardiovascular: Normal rate and regular rhythm.    Pulmonary/Chest: Effort normal. No accessory muscle usage. No respiratory distress. He has no decreased breath sounds.   Abdominal: Soft. He exhibits no distension. There is generalized tenderness. There is no guarding.   CLAUDE with serosang output   Musculoskeletal: Normal range of motion.   Ambulatory   Neurological: He is alert, oriented to person, place, and time and easily aroused. No sensory deficit.   Skin: Skin is warm and dry. "   Psychiatric: He has a normal mood and affect. His behavior is normal.   Nursing note and vitals reviewed.      Medical Decision Making/Problem List:    Active Hospital Problems    Diagnosis   • Acute gangrenous appendicitis with perforation and peritonitis [K35.3]     Priority: High     Necrotic with stool emanating from gangrenous appendiceal orifice.  4/6 Open appendectomy, oversewn, drain placement.  4/7 Rocephin/Flagyl day 2 of 4.  Prashant Chase MD, Surgery.     • Atrial fibrillation with rapid ventricular response (CMS-HCC) [I48.91]     Priority: High     Noted intra-op with rate up to 180s per report.  Carvedilol restarted.  4/7 Recs:  - Transient AFL. Offered ablation, will defer to outpatient.  - If echo unremarkable, outpatient follow up with CV.  Humble Henry MD, Cardiology.     • History of hypertension [Z86.79]     Priority: Low     Premorbid condition treated with Valsartan.  4/6 Home medication resumed.       Core Measures & Quality Metrics:  Radiology images reviewed, Labs reviewed and Medications reviewed  Yang catheter: No Yang      DVT Prophylaxis: Enoxaparin (Lovenox)  DVT prophylaxis - mechanical: SCDs  Ulcer prophylaxis: Yes  Antibiotics: Treating active infection/contamination beyond 24 hours perioperative coverage  Assessed for rehab: Patient returned to prior level of function, rehabilitation not indicated at this time    DAYNA Score     Discussed patient condition with RN, Patient and general surgery. Dr. Eng

## 2018-04-07 NOTE — PROGRESS NOTES
"Interval History:  52-year-old male with a history of hypertension and hyperlipidemia presented with a ruptured appendix and underwent open appendectomy, developed AFL.  4/7: Out of AFL overnight. No events. <24h duration.    Physical Exam   Blood pressure 115/71, pulse 84, temperature 36.1 °C (97 °F), resp. rate 20, height 1.88 m (6' 2\"), weight 109 kg (240 lb 4.8 oz), SpO2 99 %.    Constitutional: Appears well-developed.   HENT: Normocephalic and atraumatic. No scleral icterus.   Neck: No JVD present.   Cardiovascular: Normal rate. Exam reveals no gallop and no friction rub. No murmur heard.   Pulmonary/Chest: CTAB    Abdominal: S/TTP/ND BS+   Musculoskeletal: Exhibits no edema. Pulses present.  Skin: Skin is warm and dry.     ROS: As HPI other reviewed and negative       Intake/Output Summary (Last 24 hours) at 04/07/18 0812  Last data filed at 04/07/18 0600   Gross per 24 hour   Intake             2300 ml   Output              650 ml   Net             1650 ml       Recent Labs      04/06/18   0631  04/07/18   0630   WBC  14.2*  18.3*   RBC  5.36  4.55*   HEMOGLOBIN  16.3  14.0   HEMATOCRIT  45.4  40.0*   MCV  84.7  87.9   MCH  30.4  30.8   MCHC  35.9*  35.0   RDW  36.3  40.2   PLATELETCT  240  227   MPV  8.6*  9.0     Recent Labs      04/06/18   0631  04/06/18   1510  04/07/18   0630   SODIUM  133*  136  135   POTASSIUM  3.2*  4.0  4.2   CHLORIDE  99  104  101   CO2  21  20  25   GLUCOSE  151*  142*  128*   BUN  16  15  26*   CREATININE  1.25  1.18  1.30   CALCIUM  8.8  7.5*  8.3*             Recent Labs      04/06/18   1510   TROPONINI  <0.01           No current facility-administered medications on file prior to encounter.      No current outpatient prescriptions on file prior to encounter.       Current Facility-Administered Medications   Medication Dose Frequency Provider Last Rate Last Dose   • Pharmacy Consult Request ...Pain Management Review 1 Each  1 Each PRN Kali Eng M.D.       • LR infusion   " Continuous Kali Eng M.D. 150 mL/hr at 04/06/18 1449     • enoxaparin (LOVENOX) inj 40 mg  40 mg DAILY Kali Eng M.D.       • cefTRIAXone (ROCEPHIN) syringe 2 g  2 g QHS Kali Eng M.D.   2 g at 04/06/18 2100   • metroNIDAZOLE (FLAGYL) IVPB 500 mg  500 mg Q8HRS Kali Eng M.D.   Stopped at 04/07/18 0721   • HYDROmorphone (DILAUDID) injection 0.5-1 mg  0.5-1 mg Q3HRS PRN Kali Eng M.D.       • ondansetron (ZOFRAN) syringe/vial injection 4 mg  4 mg Q4HRS PRN Kali Eng M.D.       • insulin regular (HUMULIN R) injection 1-6 Units  1-6 Units Q6HRS Kali Eng M.D.   Stopped at 04/07/18 0600    And   • glucose 4 g chewable tablet 16 g  16 g Q15 MIN PRN Kali Eng M.D.        And   • dextrose 50% (D50W) injection 25 mL  25 mL Q15 MIN PRN Kali Eng M.D.       • acetaminophen (TYLENOL) tablet 650 mg  650 mg 4X/DAY Kali Eng M.D.   650 mg at 04/06/18 2253   • hydrALAZINE (APRESOLINE) injection 10-20 mg  10-20 mg Q4HRS PRN Kali Eng M.D.       • labetalol (NORMODYNE,TRANDATE) injection 10-20 mg  10-20 mg Q4HRS PRN Kali Eng M.D.       • carvedilol (COREG) tablet 6.25 mg  6.25 mg BID WITH MEALS Kali Eng M.D.   6.25 mg at 04/06/18 1800   • valsartan (DIOVAN) tablet 160 mg  160 mg DAILY Kali Eng M.D.   Stopped at 04/06/18 1500   • omeprazole (PRILOSEC) capsule 20 mg  20 mg DAILY Kali Eng M.D.   20 mg at 04/06/18 1609     Last reviewed on 4/6/2018  8:12 AM by Oralia Valverde PhT  Medications reviewed    Imaging reviewed      Impressions:  1. Acute appendicitis status post open appendectomy  2. New-onset atrial flutter with rapid ventricular response  3. Systemic hypertension, chronic  4. Hypokalemia, resolved    Recommendations:  Transient AFL. Offered ablation, will defer to outpatient.  Provoked, <24h duration and spontaneously resolved after source control for sepsis and K repletion.  ASA 81mg daily once ok  per surgery, continue coreg and uptitrate to outpatient dose. Resume ARB as needed.    If echo unremarkable, outpatient fu with CV.    Discussed with primary physician and bedside nursing.    Thank you for this interesting consultation. It was my pleasure to see Jesse Norwood today.    Humble Navarro MD, FACC, [facility]  Division of Interventional Cardiology  [facility]

## 2018-04-07 NOTE — PROGRESS NOTES
Surgical Progress Note    Author: Prashant Chase Date & Time created: 2018   1:04 PM     Interval Events:  Converted back to sinus rhythm.  Pain well controlled.  Denies nausea.  Undergoing echocardiogram.    Review of Systems   Cardiovascular: Negative for chest pain.   Gastrointestinal: Positive for abdominal pain. Negative for nausea and vomiting.     Hemodynamics:  Temp (24hrs), Av.8 °C (98.3 °F), Min:36.1 °C (97 °F), Max:38.2 °C (100.8 °F)  Temperature: 36.7 °C (98.1 °F)  Pulse  Av.6  Min: 74  Max: 151Heart Rate (Monitored): 85  NIBP: 107/66     Respiratory:    Respiration: 18, Pulse Oximetry: 94 %           Neuro:  GCS = 15       Fluids:    Intake/Output Summary (Last 24 hours) at 18 1304  Last data filed at 18 1200   Gross per 24 hour   Intake             3360 ml   Output              975 ml   Net             2385 ml     Weight: 111.2 kg (245 lb 2.4 oz)  Current Diet Order   Procedures   • DIET ORDER     Physical Exam   Constitutional: He is oriented to person, place, and time. He appears well-developed and well-nourished. No distress.   HENT:   Head: Normocephalic.   Eyes: Pupils are equal, round, and reactive to light. No scleral icterus.   Cardiovascular: Normal rate and regular rhythm.    Pulmonary/Chest: Effort normal and breath sounds normal. No respiratory distress.   Abdominal: Soft. He exhibits no distension. There is tenderness (RLQ). There is no rebound and no guarding.   100 ml out CLAUDE drain, bloody   Neurological: He is alert and oriented to person, place, and time.   Psychiatric: He has a normal mood and affect. His behavior is normal. Judgment and thought content normal.     Labs:  Recent Results (from the past 24 hour(s))   EKG    Collection Time: 18  2:16 PM   Result Value Ref Range    Report       [facility]    Test Date:  2018  Pt Name:    FILOMENA AHUJA                   Department: 19  MRN:        [id number]                      Room:        S120  Gender:     Male                         Technician: [facility]  :        1965                   Requested By:SUZE WILD  Order #:    [id number]                    Reading MD: Marquise Kc MD    Measurements  Intervals                                Axis  Rate:       116                          P:  NH:                                      QRS:        -36  QRSD:       92                           T:          -11  QT:         324  QTc:        451    Interpretive Statements  ATRIAL FLUTTER, A-RATE 294  LEFT AXIS DEVIATION  LATE PRECORDIAL R/S TRANSITION  NONSPECIFIC T ABNORMALITIES, INFERIOR LEADS  BASELINE WANDER IN LEAD(S) II,aVF  No previous ECG available for comparison    Electronically Signed On 2018 14:55:21 PDT by Marquise Kc MD     BASIC METABOLIC PANEL    Collection Time: 18  3:10 PM   Result Value Ref Range    Sodium 136 135 - 145 mmol/L    Potassium 4.0 3.6 - 5.5 mmol/L    Chloride 104 96 - 112 mmol/L    Co2 20 20 - 33 mmol/L    Glucose 142 (H) 65 - 99 mg/dL    Bun 15 8 - 22 mg/dL    Creatinine 1.18 0.50 - 1.40 mg/dL    Calcium 7.5 (L) 8.5 - 10.5 mg/dL    Anion Gap 12.0 (H) 0.0 - 11.9   MAGNESIUM    Collection Time: 18  3:10 PM   Result Value Ref Range    Magnesium 1.7 1.5 - 2.5 mg/dL   TROPONIN    Collection Time: 18  3:10 PM   Result Value Ref Range    Troponin I <0.01 0.00 - 0.04 ng/mL   ESTIMATED GFR    Collection Time: 18  3:10 PM   Result Value Ref Range    GFR If African American >60 >60 mL/min/1.73 m 2    GFR If Non African American >60 >60 mL/min/1.73 m 2   ACCU-CHEK GLUCOSE    Collection Time: 18  6:10 PM   Result Value Ref Range    Glucose - Accu-Ck 173 (H) 65 - 99 mg/dL   ACCU-CHEK GLUCOSE    Collection Time: 18 12:17 AM   Result Value Ref Range    Glucose - Accu-Ck 154 (H) 65 - 99 mg/dL   CBC WITH DIFFERENTIAL    Collection Time: 18  6:30 AM   Result Value Ref Range    WBC 18.3 (H) 4.8 - 10.8 K/uL    RBC 4.55 (L) 4.70 - 6.10  M/uL    Hemoglobin 14.0 14.0 - 18.0 g/dL    Hematocrit 40.0 (L) 42.0 - 52.0 %    MCV 87.9 81.4 - 97.8 fL    MCH 30.8 27.0 - 33.0 pg    MCHC 35.0 33.7 - 35.3 g/dL    RDW 40.2 35.9 - 50.0 fL    Platelet Count 227 164 - 446 K/uL    MPV 9.0 9.0 - 12.9 fL    Neutrophils-Polys 90.50 (H) 44.00 - 72.00 %    Lymphocytes 3.80 (L) 22.00 - 41.00 %    Monocytes 4.70 0.00 - 13.40 %    Eosinophils 0.00 0.00 - 6.90 %    Basophils 0.30 0.00 - 1.80 %    Immature Granulocytes 0.70 0.00 - 0.90 %    Nucleated RBC 0.00 /100 WBC    Neutrophils (Absolute) 16.60 (H) 1.82 - 7.42 K/uL    Lymphs (Absolute) 0.70 (L) 1.00 - 4.80 K/uL    Monos (Absolute) 0.86 (H) 0.00 - 0.85 K/uL    Eos (Absolute) 0.00 0.00 - 0.51 K/uL    Baso (Absolute) 0.06 0.00 - 0.12 K/uL    Immature Granulocytes (abs) 0.12 (H) 0.00 - 0.11 K/uL    NRBC (Absolute) 0.00 K/uL   BASIC METABOLIC PANEL    Collection Time: 04/07/18  6:30 AM   Result Value Ref Range    Sodium 135 135 - 145 mmol/L    Potassium 4.2 3.6 - 5.5 mmol/L    Chloride 101 96 - 112 mmol/L    Co2 25 20 - 33 mmol/L    Glucose 128 (H) 65 - 99 mg/dL    Bun 26 (H) 8 - 22 mg/dL    Creatinine 1.30 0.50 - 1.40 mg/dL    Calcium 8.3 (L) 8.5 - 10.5 mg/dL    Anion Gap 9.0 0.0 - 11.9   MAGNESIUM    Collection Time: 04/07/18  6:30 AM   Result Value Ref Range    Magnesium 2.3 1.5 - 2.5 mg/dL   ESTIMATED GFR    Collection Time: 04/07/18  6:30 AM   Result Value Ref Range    GFR If African American >60 >60 mL/min/1.73 m 2    GFR If Non African American 58 (A) >60 mL/min/1.73 m 2   ACCU-CHEK GLUCOSE    Collection Time: 04/07/18  6:31 AM   Result Value Ref Range    Glucose - Accu-Ck 126 (H) 65 - 99 mg/dL     Medical Decision Making, by Problem:  Active Hospital Problems    Diagnosis   • Acute gangrenous appendicitis with perforation and peritonitis [K35.3]     Priority: High     Necrotic with stool emanating from gangrenous appendiceal orifice.  4/6 Open appendectomy, oversewn, drain placement.  4/7 Rocephin/Flagyl day 2 of  4.  Prashant Chase MD, Surgery.     • Atrial fibrillation with rapid ventricular response (CMS-HCC) [I48.91]     Priority: High     Noted intra-op with rate up to 180s per report.  Carvedilol restarted.  4/7 Recs:  - Transient AFL. Offered ablation, will defer to outpatient.  - If echo unremarkable, outpatient follow up with CV.  Humble Henry MD, Cardiology.     • History of hypertension [Z86.79]     Priority: Low     Premorbid condition treated with Valsartan.  4/6 Home medication resumed.       Plan:  Okay for transfer to floor.  Follow up Cardiology recommendations.  Consider wound vac in future.  AM labs to include BMP, CBC with dif.    Quality Measures:  Quality-Core Measures   Reviewed items::  Labs reviewed and Medications reviewed  Yang catheter::  No Yang  DVT prophylaxis pharmacological::  Enoxaparin (Lovenox)  DVT prophylaxis - mechanical:  SCDs  Antibiotics:  Treating active infection/contamination beyond 24 hours perioperative coverage      Discussed patient condition with Patient and Surgical intensivist

## 2018-04-07 NOTE — CARE PLAN
Problem: Safety  Goal: Will remain free from falls    Intervention: Implement fall precautions  Treaded socks on patient, personal belongings by bedside, call light within reach, bed in low position. Pt verbalizes understanding of fall prevention and to call for assistance.      Problem: Venous Thromboembolism (VTW)/Deep Vein Thrombosis (DVT) Prevention:  Goal: Patient will participate in Venous Thrombosis (VTE)/Deep Vein Thrombosis (DVT)Prevention Measures    Intervention: Ensure patient wears graduated elastic stockings (AMY hose) and/or SCDs, if ordered, when in bed or chair (Remove at least once per shift for skin check)  SCD's on patient and machine on. Patient verbalizes understanding of use.

## 2018-04-07 NOTE — CARE PLAN
Problem: Communication  Goal: The ability to communicate needs accurately and effectively will improve  Patient oriented to unit and call light system; educated regarding plan of care and treatments.     Problem: Fluid Volume:  Goal: Will maintain balanced intake and output    Intervention: Monitor, educate, and encourage compliance with therapeutic intake of liquids  Strict documentation of I/O to monitor fluid balance.

## 2018-04-08 LAB
ANION GAP SERPL CALC-SCNC: 7 MMOL/L (ref 0–11.9)
BACTERIA UR CULT: NORMAL
BASOPHILS # BLD AUTO: 0.3 % (ref 0–1.8)
BASOPHILS # BLD: 0.04 K/UL (ref 0–0.12)
BUN SERPL-MCNC: 20 MG/DL (ref 8–22)
CALCIUM SERPL-MCNC: 8.5 MG/DL (ref 8.5–10.5)
CHLORIDE SERPL-SCNC: 101 MMOL/L (ref 96–112)
CO2 SERPL-SCNC: 28 MMOL/L (ref 20–33)
CREAT SERPL-MCNC: 0.87 MG/DL (ref 0.5–1.4)
EOSINOPHIL # BLD AUTO: 0 K/UL (ref 0–0.51)
EOSINOPHIL NFR BLD: 0 % (ref 0–6.9)
ERYTHROCYTE [DISTWIDTH] IN BLOOD BY AUTOMATED COUNT: 40.3 FL (ref 35.9–50)
GLUCOSE BLD-MCNC: 111 MG/DL (ref 65–99)
GLUCOSE BLD-MCNC: 123 MG/DL (ref 65–99)
GLUCOSE BLD-MCNC: 130 MG/DL (ref 65–99)
GLUCOSE BLD-MCNC: 131 MG/DL (ref 65–99)
GLUCOSE SERPL-MCNC: 124 MG/DL (ref 65–99)
HCT VFR BLD AUTO: 40.5 % (ref 42–52)
HGB BLD-MCNC: 14.1 G/DL (ref 14–18)
IMM GRANULOCYTES # BLD AUTO: 0.29 K/UL (ref 0–0.11)
IMM GRANULOCYTES NFR BLD AUTO: 2 % (ref 0–0.9)
LYMPHOCYTES # BLD AUTO: 1.1 K/UL (ref 1–4.8)
LYMPHOCYTES NFR BLD: 7.4 % (ref 22–41)
MCH RBC QN AUTO: 30.5 PG (ref 27–33)
MCHC RBC AUTO-ENTMCNC: 34.8 G/DL (ref 33.7–35.3)
MCV RBC AUTO: 87.5 FL (ref 81.4–97.8)
MONOCYTES # BLD AUTO: 0.86 K/UL (ref 0–0.85)
MONOCYTES NFR BLD AUTO: 5.8 % (ref 0–13.4)
NEUTROPHILS # BLD AUTO: 12.48 K/UL (ref 1.82–7.42)
NEUTROPHILS NFR BLD: 84.5 % (ref 44–72)
NRBC # BLD AUTO: 0 K/UL
NRBC BLD-RTO: 0 /100 WBC
PLATELET # BLD AUTO: 271 K/UL (ref 164–446)
PMV BLD AUTO: 9.1 FL (ref 9–12.9)
POTASSIUM SERPL-SCNC: 3.2 MMOL/L (ref 3.6–5.5)
RBC # BLD AUTO: 4.63 M/UL (ref 4.7–6.1)
SIGNIFICANT IND 70042: NORMAL
SITE SITE: NORMAL
SODIUM SERPL-SCNC: 136 MMOL/L (ref 135–145)
SOURCE SOURCE: NORMAL
WBC # BLD AUTO: 14.8 K/UL (ref 4.8–10.8)

## 2018-04-08 PROCEDURE — 770020 HCHG ROOM/CARE - TELE (206)

## 2018-04-08 PROCEDURE — A9270 NON-COVERED ITEM OR SERVICE: HCPCS | Performed by: INTERNAL MEDICINE

## 2018-04-08 PROCEDURE — 700102 HCHG RX REV CODE 250 W/ 637 OVERRIDE(OP): Performed by: SURGERY

## 2018-04-08 PROCEDURE — 700102 HCHG RX REV CODE 250 W/ 637 OVERRIDE(OP): Performed by: NURSE PRACTITIONER

## 2018-04-08 PROCEDURE — 700111 HCHG RX REV CODE 636 W/ 250 OVERRIDE (IP): Performed by: SURGERY

## 2018-04-08 PROCEDURE — 85025 COMPLETE CBC W/AUTO DIFF WBC: CPT

## 2018-04-08 PROCEDURE — 306263 VAC CANNISTER W/GEL 500ML: Performed by: SURGERY

## 2018-04-08 PROCEDURE — 97605 NEG PRS WND THER DME<=50SQCM: CPT

## 2018-04-08 PROCEDURE — 700102 HCHG RX REV CODE 250 W/ 637 OVERRIDE(OP): Performed by: INTERNAL MEDICINE

## 2018-04-08 PROCEDURE — 36415 COLL VENOUS BLD VENIPUNCTURE: CPT

## 2018-04-08 PROCEDURE — 700101 HCHG RX REV CODE 250: Performed by: SURGERY

## 2018-04-08 PROCEDURE — 700105 HCHG RX REV CODE 258: Performed by: SURGERY

## 2018-04-08 PROCEDURE — 97161 PT EVAL LOW COMPLEX 20 MIN: CPT

## 2018-04-08 PROCEDURE — 80048 BASIC METABOLIC PNL TOTAL CA: CPT

## 2018-04-08 PROCEDURE — A9270 NON-COVERED ITEM OR SERVICE: HCPCS | Performed by: SURGERY

## 2018-04-08 PROCEDURE — 82962 GLUCOSE BLOOD TEST: CPT

## 2018-04-08 PROCEDURE — A9270 NON-COVERED ITEM OR SERVICE: HCPCS | Performed by: NURSE PRACTITIONER

## 2018-04-08 RX ORDER — AMIODARONE HYDROCHLORIDE 200 MG/1
400 TABLET ORAL 3 TIMES DAILY
Status: DISCONTINUED | OUTPATIENT
Start: 2018-04-08 | End: 2018-04-10

## 2018-04-08 RX ORDER — SODIUM CHLORIDE AND POTASSIUM CHLORIDE 300; 900 MG/100ML; MG/100ML
INJECTION, SOLUTION INTRAVENOUS CONTINUOUS
Status: DISCONTINUED | OUTPATIENT
Start: 2018-04-08 | End: 2018-04-10

## 2018-04-08 RX ADMIN — OXYCODONE HYDROCHLORIDE AND ACETAMINOPHEN 1 TABLET: 5; 325 TABLET ORAL at 04:25

## 2018-04-08 RX ADMIN — POTASSIUM CHLORIDE AND SODIUM CHLORIDE: 900; 300 INJECTION, SOLUTION INTRAVENOUS at 11:54

## 2018-04-08 RX ADMIN — CARVEDILOL 6.25 MG: 6.25 TABLET, FILM COATED ORAL at 08:51

## 2018-04-08 RX ADMIN — HYDROMORPHONE HYDROCHLORIDE 1 MG: 10 INJECTION, SOLUTION INTRAMUSCULAR; INTRAVENOUS; SUBCUTANEOUS at 22:44

## 2018-04-08 RX ADMIN — SODIUM CHLORIDE, POTASSIUM CHLORIDE, SODIUM LACTATE AND CALCIUM CHLORIDE: 600; 310; 30; 20 INJECTION, SOLUTION INTRAVENOUS at 06:25

## 2018-04-08 RX ADMIN — CEFTRIAXONE 2 G: 2 INJECTION, POWDER, FOR SOLUTION INTRAMUSCULAR; INTRAVENOUS at 20:35

## 2018-04-08 RX ADMIN — METRONIDAZOLE 500 MG: 500 INJECTION, SOLUTION INTRAVENOUS at 06:35

## 2018-04-08 RX ADMIN — OXYCODONE HYDROCHLORIDE AND ACETAMINOPHEN 2 TABLET: 5; 325 TABLET ORAL at 20:34

## 2018-04-08 RX ADMIN — AMIODARONE HYDROCHLORIDE 400 MG: 200 TABLET ORAL at 20:34

## 2018-04-08 RX ADMIN — OMEPRAZOLE 20 MG: 20 CAPSULE, DELAYED RELEASE ORAL at 08:51

## 2018-04-08 RX ADMIN — ASPIRIN 81 MG: 81 TABLET, COATED ORAL at 08:51

## 2018-04-08 RX ADMIN — VALSARTAN 160 MG: 80 TABLET ORAL at 08:51

## 2018-04-08 RX ADMIN — OXYCODONE HYDROCHLORIDE AND ACETAMINOPHEN 1 TABLET: 5; 325 TABLET ORAL at 16:21

## 2018-04-08 RX ADMIN — HYDROMORPHONE HYDROCHLORIDE 0.5 MG: 10 INJECTION, SOLUTION INTRAMUSCULAR; INTRAVENOUS; SUBCUTANEOUS at 06:26

## 2018-04-08 RX ADMIN — ENOXAPARIN SODIUM 40 MG: 100 INJECTION SUBCUTANEOUS at 08:51

## 2018-04-08 RX ADMIN — OXYCODONE HYDROCHLORIDE AND ACETAMINOPHEN 1 TABLET: 5; 325 TABLET ORAL at 16:26

## 2018-04-08 RX ADMIN — ACETAMINOPHEN 650 MG: 325 TABLET, FILM COATED ORAL at 08:51

## 2018-04-08 RX ADMIN — AMIODARONE HYDROCHLORIDE 400 MG: 200 TABLET ORAL at 14:55

## 2018-04-08 RX ADMIN — METRONIDAZOLE 500 MG: 500 INJECTION, SOLUTION INTRAVENOUS at 13:08

## 2018-04-08 RX ADMIN — METRONIDAZOLE 500 MG: 500 INJECTION, SOLUTION INTRAVENOUS at 22:34

## 2018-04-08 RX ADMIN — HYDROMORPHONE HYDROCHLORIDE 1 MG: 10 INJECTION, SOLUTION INTRAMUSCULAR; INTRAVENOUS; SUBCUTANEOUS at 10:35

## 2018-04-08 RX ADMIN — CARVEDILOL 6.25 MG: 6.25 TABLET, FILM COATED ORAL at 16:26

## 2018-04-08 RX ADMIN — ACETAMINOPHEN 650 MG: 325 TABLET, FILM COATED ORAL at 11:58

## 2018-04-08 ASSESSMENT — PAIN SCALES - GENERAL
PAINLEVEL_OUTOF10: 5
PAINLEVEL_OUTOF10: 2
PAINLEVEL_OUTOF10: 7
PAINLEVEL_OUTOF10: 5
PAINLEVEL_OUTOF10: 5
PAINLEVEL_OUTOF10: 1

## 2018-04-08 ASSESSMENT — ENCOUNTER SYMPTOMS
ABDOMINAL PAIN: 1
NAUSEA: 0
VOMITING: 0

## 2018-04-08 NOTE — CARE PLAN
Problem: Safety  Goal: Will remain free from falls  Outcome: PROGRESSING AS EXPECTED  Pt up self with even and steady gait and calls appropriately. Bed locked and in lowest position. Call light within reach.     Problem: Bowel/Gastric:  Goal: Normal bowel function is maintained or improved  Outcome: PROGRESSING AS EXPECTED  Last BM today. Will assess bm and bs qshift and prn

## 2018-04-08 NOTE — PROGRESS NOTES
Patient awake and alert, lying in bed. Pt A&Ox4.  Pt states pain is 1/10 and declining pain medication at this time.  Pt has RLQ transverse dressing in place and C/D/I.  RLQ mellissa drain in place to self suction with small amount of serosanguinous fluid.  Pt on RA and satting adequately.  Tele monitor in place.  Pt tolerating clear liquids, no order to advance diet.  Pt voiding well in urinal.  Pt states last BM was this morning.  Pt up self with even and steady gait and calls appropriately. Bed locked and in lowest position. Call light within reach.

## 2018-04-08 NOTE — PROGRESS NOTES
Pt arrived to U T413-2. Pt aao x4, on room air. Dressing to right lower quadrant cdi. CLAUDE to rlq with minimal serosang drainage. Telemetry monitor in place. scd's on. Plan of care for shift discussed with pt. Pt receptive.

## 2018-04-08 NOTE — PROGRESS NOTES
Patient awake and alert, lying in bed. Pt A&Ox4. Morning med given.  Pt states pain is 6/10 and was medicated (see MAR).  Bilateral groin prevenas in place to suction.  Pt states last BM pta and is passing gas. Pt on 0.5-1LNC and monitored with .  Pt up with 1 assist and FWW and calls appropriately. Bed locked and in lowest position. Call light within reach.

## 2018-04-08 NOTE — PROGRESS NOTES
Bedside report received.  Assessment complete.  A&O x 4. Patient calls appropriately.  Patient up with stanbdy assist.    Patient has 5/10 pain. Medication provided  Denies N&V. Tolerating clear liquid diet.  Surgical incision noted to RLQ abdomen.  positive void, positive flatus  Patient denies SOB.  SCD's on when pt is in bed, pt requesting to have them off when he is siting in the chair.  Patient resting comfortably in bed.  Review plan with of care with patient. Call light and personal belongings with in reach. Hourly rounding in place. All needs met at this time.

## 2018-04-08 NOTE — PROGRESS NOTES
Dr. Navarro called and notified of patient going back into A-flutter.  MD ordered Amiodarone 400mg PO TID.  When asked, MD stated he did not want to change Coreg dosage despite note recommending 12.5mg PO BID and pt currently taking 6.25mg PO BID.  Orders read back to MD and verified.   Will continue to monitor pt.

## 2018-04-08 NOTE — PROGRESS NOTES
1645 Patient transferred on telemetry monitoring with ICU RN to T413-2; all belongings sent with patient. Report given to JAMIL Cortez. All questions addressed.

## 2018-04-08 NOTE — CARE PLAN
Problem: Safety  Goal: Will remain free from falls  Outcome: PROGRESSING AS EXPECTED  Pt up with 1 assist and FWW and calls appropriately. Bed locked and in lowest position. Call light within reach.     Problem: Bowel/Gastric:  Goal: Normal bowel function is maintained or improved  Outcome: PROGRESSING AS EXPECTED  Last bm pta. Will assess bm and bs qshift and prn

## 2018-04-08 NOTE — WOUND TEAM
[facility]  Inpatient Services  Initial Wound & Skin Care Evaluation    Admission Date:   4/6/18  Consult Date:    4/8/18  HPI, PMH, SH: Reviewed  Unit where seen by Wound Team: [id number]    WOUND CONSULT RELATED TO: VAC placement    SUBJECTIVE:  'I feel much better'    Self Report / Pain Level: pt reported minimal pain with foam packing    OBJECTIVE: Pt able to move about and is walking the halls.  Gauze packing in wound.  WOUND TYPE, LOCATION, CHARACTERISTICS (Pressure ulcers: location, stage, POA or date identified)    Wound Type/Location:  Open surgical wound RLQ    Periwound:    intact  Drainage:     Minimal serosanguinous  Tissue Type and %:    100% red  Wound Edges:    intact  Odor:     none  Exposed structure(s):   none  S&S of Infection:    none  Measurements:   4/8/18  Length:    1.0cm   Width:     12.0cm   Depth:     3.2cm   Tracts/undermining:   none      INTERVENTIONS BY WOUND TEAM: Dressing removed, wounds cleansed with wound  Cleanser, hortencia wound protected with skin prep and drape. 1 pieced black foam gently packed into wound, 1 black foam button placed under TRAC pad, NPWT running at 125 mmHg continuous with good seal   Dressing types: NPWT  Patient/family educated on rationale for plan of care__X - described components of the act VAC and home management of the VAC system, OP wound schedule__  Interdisciplinary Collaboration: RN    EVALUATION: Pt s/p appy with wound left open to heal by secondary intention.  Wound bed is clean and should respond well to NPWT  Last Kenan Score by RN:  Factors affecting wound healing: none  Goals: decrease depth of wound by 10% in 2 weeks    NURSING PLAN OF CARE: (X)  Dressing changes: See Dressing Maintenance orders: X  Skin care: See Skin Care orders:   Rectal tube care: See Rectal Tube Care orders:   Other orders:    RSKIN: CURRENT (X) ORDERED (O)  Q shift Kenan:  X  Q shift pressure point assessments:  X  Atmosair        ALBINO      Bariatric ALBINO       Bariatric foam        Heel float boots       Heels floated on pillows      Barrier wipes      Barrier Cream      Barrier paste      Sacral silicone dressing      Silicone O2 tubing      Anchorfast      Trach with Optifoam split foam       Waffle cushion      Rectal tube or BMS      Antifungal tx    Turn q 2 hours   Up to chair    Ambulate X  PT/OT     Dietician      PO     TF   TPN     PVN    NPO   # days   Other       WOUND TEAM PLAN OF CARE (X):   NPWT change 3 x week:      X  Dressing changes by wound team:     X  Follow up as needed:       Other (explain):    Anticipated discharge plans (X):  SNF:           Home Care:           Outpatient Wound Center:  X         Self Care:            Other:

## 2018-04-08 NOTE — PROGRESS NOTES
Surgical Progress Note    Author: Prashant Chase Date & Time created: 2018   8:33 AM     Interval Events:  Reports flatus, BM.  OOB to bathroom only.  Not taking much po.  Pain adequately controlled.  Review of Systems   Gastrointestinal: Positive for abdominal pain. Negative for nausea and vomiting.     Hemodynamics:  Temp (24hrs), Av.6 °C (97.8 °F), Min:36.3 °C (97.4 °F), Max:36.9 °C (98.4 °F)  Temperature: 36.4 °C (97.6 °F)  Pulse  Av.5  Min: 74  Max: 151Heart Rate (Monitored): 90  Blood Pressure: 117/83, NIBP: 126/73     Respiratory:    Respiration: 16, Pulse Oximetry: 97 %           Neuro:  GCS = 15       Fluids:    Intake/Output Summary (Last 24 hours) at 18 0833  Last data filed at 18 0400   Gross per 24 hour   Intake             4470 ml   Output              995 ml   Net             3475 ml        Current Diet Order   Procedures   • DIET ORDER     Physical Exam   Constitutional: He is oriented to person, place, and time. He appears well-developed and well-nourished. No distress.   HENT:   Head: Normocephalic.   Eyes: Pupils are equal, round, and reactive to light. No scleral icterus.   Cardiovascular: Normal rate and regular rhythm.    Pulmonary/Chest: Effort normal and breath sounds normal. No respiratory distress.   Abdominal: Soft. Bowel sounds are normal. He exhibits no distension. There is tenderness. There is no rebound and no guarding.   Wound packed, dressing clean.  Drain output serosanguinous   Neurological: He is alert and oriented to person, place, and time.   Psychiatric: He has a normal mood and affect. His behavior is normal. Judgment and thought content normal.     Labs:  Recent Results (from the past 24 hour(s))   ECHOCARDIOGRAM COMP W/O CONT    Collection Time: 18 12:27 PM   Result Value Ref Range    Eject.Frac. MOD BP 69.56     Eject.Frac. MOD 4C 69.62     Eject.Frac. MOD 2C 68.66     Left Ventrical Ejection Fraction 70    ACCU-CHEK GLUCOSE    Collection  Time: 04/07/18  1:09 PM   Result Value Ref Range    Glucose - Accu-Ck 175 (H) 65 - 99 mg/dL   ACCU-CHEK GLUCOSE    Collection Time: 04/07/18  5:30 PM   Result Value Ref Range    Glucose - Accu-Ck 150 (H) 65 - 99 mg/dL   ACCU-CHEK GLUCOSE    Collection Time: 04/08/18 12:25 AM   Result Value Ref Range    Glucose - Accu-Ck 131 (H) 65 - 99 mg/dL   BASIC METABOLIC PANEL    Collection Time: 04/08/18  4:47 AM   Result Value Ref Range    Sodium 136 135 - 145 mmol/L    Potassium 3.2 (L) 3.6 - 5.5 mmol/L    Chloride 101 96 - 112 mmol/L    Co2 28 20 - 33 mmol/L    Glucose 124 (H) 65 - 99 mg/dL    Bun 20 8 - 22 mg/dL    Creatinine 0.87 0.50 - 1.40 mg/dL    Calcium 8.5 8.5 - 10.5 mg/dL    Anion Gap 7.0 0.0 - 11.9   CBC WITH DIFFERENTIAL    Collection Time: 04/08/18  4:47 AM   Result Value Ref Range    WBC 14.8 (H) 4.8 - 10.8 K/uL    RBC 4.63 (L) 4.70 - 6.10 M/uL    Hemoglobin 14.1 14.0 - 18.0 g/dL    Hematocrit 40.5 (L) 42.0 - 52.0 %    MCV 87.5 81.4 - 97.8 fL    MCH 30.5 27.0 - 33.0 pg    MCHC 34.8 33.7 - 35.3 g/dL    RDW 40.3 35.9 - 50.0 fL    Platelet Count 271 164 - 446 K/uL    MPV 9.1 9.0 - 12.9 fL    Neutrophils-Polys 84.50 (H) 44.00 - 72.00 %    Lymphocytes 7.40 (L) 22.00 - 41.00 %    Monocytes 5.80 0.00 - 13.40 %    Eosinophils 0.00 0.00 - 6.90 %    Basophils 0.30 0.00 - 1.80 %    Immature Granulocytes 2.00 (H) 0.00 - 0.90 %    Nucleated RBC 0.00 /100 WBC    Neutrophils (Absolute) 12.48 (H) 1.82 - 7.42 K/uL    Lymphs (Absolute) 1.10 1.00 - 4.80 K/uL    Monos (Absolute) 0.86 (H) 0.00 - 0.85 K/uL    Eos (Absolute) 0.00 0.00 - 0.51 K/uL    Baso (Absolute) 0.04 0.00 - 0.12 K/uL    Immature Granulocytes (abs) 0.29 (H) 0.00 - 0.11 K/uL    NRBC (Absolute) 0.00 K/uL   ESTIMATED GFR    Collection Time: 04/08/18  4:47 AM   Result Value Ref Range    GFR If African American >60 >60 mL/min/1.73 m 2    GFR If Non African American >60 >60 mL/min/1.73 m 2   ACCU-CHEK GLUCOSE    Collection Time: 04/08/18  6:34 AM   Result Value Ref Range     Glucose - Accu-Ck 111 (H) 65 - 99 mg/dL     Medical Decision Making, by Problem:  Active Hospital Problems    Diagnosis   • Acute gangrenous appendicitis with perforation and peritonitis [K35.3]     Priority: High     Necrotic with stool emanating from gangrenous appendiceal orifice.  4/6 Open appendectomy, oversewn, drain placement.  4/7 Rocephin/Flagyl day 2 of 4.  Prashant Chase MD, Surgery.     • Atrial fibrillation with rapid ventricular response (CMS-HCC) [I48.91]     Priority: High     Noted intra-op with rate up to 180s per report.  Carvedilol restarted.  4/7 Recs:  - Transient AFL. Offered ablation, will defer to outpatient.  - If echo unremarkable, outpatient follow up with CV.  Humbel Henry MD, Cardiology.     • History of hypertension [Z86.79]     Priority: Low     Premorbid condition treated with Valsartan.  4/6 Home medication resumed.       Plan:  Change to MIVF.  Wound vac today.  Advance to full liquids as he feels able.  AM labs to include BMP.  Ambulate in halls.  Quality Measures:  Quality-Core Measures   Reviewed items::  Labs reviewed and Medications reviewed  Yang catheter::  No Yang  DVT prophylaxis pharmacological::  Enoxaparin (Lovenox)  Antibiotics:  Treating active infection/contamination beyond 24 hours perioperative coverage and Treating fecal contamination      Discussed patient condition with Patient

## 2018-04-09 LAB
ANION GAP SERPL CALC-SCNC: 9 MMOL/L (ref 0–11.9)
BASOPHILS # BLD AUTO: 0 % (ref 0–1.8)
BASOPHILS # BLD: 0 K/UL (ref 0–0.12)
BUN SERPL-MCNC: 19 MG/DL (ref 8–22)
CALCIUM SERPL-MCNC: 8.1 MG/DL (ref 8.5–10.5)
CHLORIDE SERPL-SCNC: 104 MMOL/L (ref 96–112)
CO2 SERPL-SCNC: 24 MMOL/L (ref 20–33)
CREAT SERPL-MCNC: 0.89 MG/DL (ref 0.5–1.4)
EOSINOPHIL # BLD AUTO: 0.09 K/UL (ref 0–0.51)
EOSINOPHIL NFR BLD: 0.9 % (ref 0–6.9)
ERYTHROCYTE [DISTWIDTH] IN BLOOD BY AUTOMATED COUNT: 41.1 FL (ref 35.9–50)
GLUCOSE BLD-MCNC: 103 MG/DL (ref 65–99)
GLUCOSE BLD-MCNC: 107 MG/DL (ref 65–99)
GLUCOSE BLD-MCNC: 114 MG/DL (ref 65–99)
GLUCOSE BLD-MCNC: 117 MG/DL (ref 65–99)
GLUCOSE SERPL-MCNC: 131 MG/DL (ref 65–99)
HCT VFR BLD AUTO: 39.8 % (ref 42–52)
HGB BLD-MCNC: 13.8 G/DL (ref 14–18)
LYMPHOCYTES # BLD AUTO: 1.13 K/UL (ref 1–4.8)
LYMPHOCYTES NFR BLD: 11.6 % (ref 22–41)
MANUAL DIFF BLD: NORMAL
MCH RBC QN AUTO: 30.5 PG (ref 27–33)
MCHC RBC AUTO-ENTMCNC: 34.7 G/DL (ref 33.7–35.3)
MCV RBC AUTO: 87.9 FL (ref 81.4–97.8)
METAMYELOCYTES NFR BLD MANUAL: 0.9 %
MONOCYTES # BLD AUTO: 0.26 K/UL (ref 0–0.85)
MONOCYTES NFR BLD AUTO: 2.7 % (ref 0–13.4)
MORPHOLOGY BLD-IMP: NORMAL
MYELOCYTES NFR BLD MANUAL: 0.9 %
NEUTROPHILS # BLD AUTO: 8.05 K/UL (ref 1.82–7.42)
NEUTROPHILS NFR BLD: 80.3 % (ref 44–72)
NEUTS BAND NFR BLD MANUAL: 2.7 % (ref 0–10)
NRBC # BLD AUTO: 0 K/UL
NRBC BLD-RTO: 0 /100 WBC
PLATELET # BLD AUTO: 278 K/UL (ref 164–446)
PLATELET BLD QL SMEAR: NORMAL
PMV BLD AUTO: 8.9 FL (ref 9–12.9)
POTASSIUM SERPL-SCNC: 3.4 MMOL/L (ref 3.6–5.5)
RBC # BLD AUTO: 4.53 M/UL (ref 4.7–6.1)
RBC BLD AUTO: NORMAL
SODIUM SERPL-SCNC: 137 MMOL/L (ref 135–145)
WBC # BLD AUTO: 9.7 K/UL (ref 4.8–10.8)

## 2018-04-09 PROCEDURE — A9270 NON-COVERED ITEM OR SERVICE: HCPCS | Performed by: SURGERY

## 2018-04-09 PROCEDURE — A9270 NON-COVERED ITEM OR SERVICE: HCPCS | Performed by: INTERNAL MEDICINE

## 2018-04-09 PROCEDURE — 700101 HCHG RX REV CODE 250: Performed by: SURGERY

## 2018-04-09 PROCEDURE — 80048 BASIC METABOLIC PNL TOTAL CA: CPT

## 2018-04-09 PROCEDURE — 700102 HCHG RX REV CODE 250 W/ 637 OVERRIDE(OP): Performed by: SURGERY

## 2018-04-09 PROCEDURE — 85027 COMPLETE CBC AUTOMATED: CPT

## 2018-04-09 PROCEDURE — A9270 NON-COVERED ITEM OR SERVICE: HCPCS | Performed by: NURSE PRACTITIONER

## 2018-04-09 PROCEDURE — 36415 COLL VENOUS BLD VENIPUNCTURE: CPT

## 2018-04-09 PROCEDURE — 85007 BL SMEAR W/DIFF WBC COUNT: CPT

## 2018-04-09 PROCEDURE — 700102 HCHG RX REV CODE 250 W/ 637 OVERRIDE(OP): Performed by: NURSE PRACTITIONER

## 2018-04-09 PROCEDURE — 700102 HCHG RX REV CODE 250 W/ 637 OVERRIDE(OP): Performed by: INTERNAL MEDICINE

## 2018-04-09 PROCEDURE — 700111 HCHG RX REV CODE 636 W/ 250 OVERRIDE (IP): Performed by: SURGERY

## 2018-04-09 PROCEDURE — 82962 GLUCOSE BLOOD TEST: CPT

## 2018-04-09 PROCEDURE — 770020 HCHG ROOM/CARE - TELE (206)

## 2018-04-09 RX ORDER — POTASSIUM CHLORIDE 20 MEQ/1
20 TABLET, EXTENDED RELEASE ORAL 2 TIMES DAILY
Status: DISCONTINUED | OUTPATIENT
Start: 2018-04-09 | End: 2018-04-10 | Stop reason: HOSPADM

## 2018-04-09 RX ORDER — DIPHENHYDRAMINE HCL 25 MG
50 TABLET ORAL EVERY 12 HOURS PRN
Status: DISCONTINUED | OUTPATIENT
Start: 2018-04-09 | End: 2018-04-09

## 2018-04-09 RX ORDER — ACETAMINOPHEN 325 MG/1
650 TABLET ORAL EVERY 6 HOURS PRN
Status: DISCONTINUED | OUTPATIENT
Start: 2018-04-09 | End: 2018-04-10 | Stop reason: HOSPADM

## 2018-04-09 RX ORDER — BISACODYL 10 MG
10 SUPPOSITORY, RECTAL RECTAL DAILY
Status: DISCONTINUED | OUTPATIENT
Start: 2018-04-09 | End: 2018-04-10 | Stop reason: HOSPADM

## 2018-04-09 RX ORDER — DIPHENHYDRAMINE HCL 25 MG
50 TABLET ORAL EVERY 12 HOURS PRN
Status: DISCONTINUED | OUTPATIENT
Start: 2018-04-09 | End: 2018-04-10 | Stop reason: HOSPADM

## 2018-04-09 RX ADMIN — CARVEDILOL 6.25 MG: 6.25 TABLET, FILM COATED ORAL at 17:18

## 2018-04-09 RX ADMIN — ENOXAPARIN SODIUM 40 MG: 100 INJECTION SUBCUTANEOUS at 07:59

## 2018-04-09 RX ADMIN — DIPHENHYDRAMINE HCL 50 MG: 25 TABLET ORAL at 21:36

## 2018-04-09 RX ADMIN — ACETAMINOPHEN 650 MG: 325 TABLET, FILM COATED ORAL at 07:59

## 2018-04-09 RX ADMIN — POTASSIUM CHLORIDE 20 MEQ: 1500 TABLET, EXTENDED RELEASE ORAL at 11:24

## 2018-04-09 RX ADMIN — POTASSIUM CHLORIDE 20 MEQ: 1500 TABLET, EXTENDED RELEASE ORAL at 21:36

## 2018-04-09 RX ADMIN — ASPIRIN 81 MG: 81 TABLET, COATED ORAL at 07:59

## 2018-04-09 RX ADMIN — AMIODARONE HYDROCHLORIDE 400 MG: 200 TABLET ORAL at 21:36

## 2018-04-09 RX ADMIN — AMIODARONE HYDROCHLORIDE 400 MG: 200 TABLET ORAL at 07:59

## 2018-04-09 RX ADMIN — VALSARTAN 160 MG: 80 TABLET ORAL at 07:59

## 2018-04-09 RX ADMIN — AMIODARONE HYDROCHLORIDE 400 MG: 200 TABLET ORAL at 13:53

## 2018-04-09 RX ADMIN — METRONIDAZOLE 500 MG: 500 INJECTION, SOLUTION INTRAVENOUS at 06:27

## 2018-04-09 RX ADMIN — CARVEDILOL 6.25 MG: 6.25 TABLET, FILM COATED ORAL at 07:59

## 2018-04-09 RX ADMIN — ACETAMINOPHEN 650 MG: 325 TABLET, FILM COATED ORAL at 17:18

## 2018-04-09 RX ADMIN — DIPHENHYDRAMINE HCL 25 MG: 25 TABLET ORAL at 13:52

## 2018-04-09 RX ADMIN — ACETAMINOPHEN 650 MG: 325 TABLET, FILM COATED ORAL at 12:17

## 2018-04-09 RX ADMIN — ONDANSETRON 4 MG: 2 INJECTION, SOLUTION INTRAMUSCULAR; INTRAVENOUS at 21:36

## 2018-04-09 RX ADMIN — DIPHENHYDRAMINE HCL 25 MG: 25 TABLET ORAL at 15:35

## 2018-04-09 RX ADMIN — METRONIDAZOLE 500 MG: 500 INJECTION, SOLUTION INTRAVENOUS at 13:53

## 2018-04-09 RX ADMIN — ONDANSETRON 4 MG: 2 INJECTION, SOLUTION INTRAMUSCULAR; INTRAVENOUS at 00:36

## 2018-04-09 RX ADMIN — ACETAMINOPHEN 650 MG: 325 TABLET, FILM COATED ORAL at 23:09

## 2018-04-09 RX ADMIN — OMEPRAZOLE 20 MG: 20 CAPSULE, DELAYED RELEASE ORAL at 07:59

## 2018-04-09 RX ADMIN — METRONIDAZOLE 500 MG: 500 INJECTION, SOLUTION INTRAVENOUS at 21:35

## 2018-04-09 RX ADMIN — BISACODYL 10 MG: 10 SUPPOSITORY RECTAL at 12:17

## 2018-04-09 RX ADMIN — CEFTRIAXONE 2 G: 2 INJECTION, POWDER, FOR SOLUTION INTRAMUSCULAR; INTRAVENOUS at 21:35

## 2018-04-09 ASSESSMENT — ENCOUNTER SYMPTOMS
CONSTIPATION: 1
ABDOMINAL PAIN: 1
MYALGIAS: 0
WHEEZING: 0
PALPITATIONS: 0
LOSS OF CONSCIOUSNESS: 0
VOMITING: 0
FEVER: 0
EYE PAIN: 0
SPEECH CHANGE: 0
BLURRED VISION: 0
COUGH: 0
CHILLS: 0
DEPRESSION: 0
NAUSEA: 0
HEMOPTYSIS: 0
BRUISES/BLEEDS EASILY: 0
EYE DISCHARGE: 0
NERVOUS/ANXIOUS: 0

## 2018-04-09 ASSESSMENT — PAIN SCALES - GENERAL
PAINLEVEL_OUTOF10: 2
PAINLEVEL_OUTOF10: 3
PAINLEVEL_OUTOF10: 2

## 2018-04-09 NOTE — PROGRESS NOTES
Bedside report received.  Assessment complete.  A&O x 4. Patient calls appropriately.  Patient up self. Steady gait.  Patient has 5/10 pain. MAR mediation given.  Denies N&V. Tolerating diet.  RLQ wound vac and CLAUDE.  + void, + flatus  Patient denies SOB.  SCD's in use.  Tele monitoring in place.       Review plan with of care with patient. Call light and personal belongings with in reach. Hourly rounding in place. All needs met at this time.

## 2018-04-09 NOTE — CARE PLAN
Problem: Safety  Goal: Will remain free from injury  Outcome: PROGRESSING AS EXPECTED  Educated patient on use of call light    Problem: Pain Management  Goal: Pain level will decrease to patient's comfort goal  Outcome: PROGRESSING AS EXPECTED  Medicated per MAR prn

## 2018-04-09 NOTE — PROGRESS NOTES
Cardiology Progress Note               Author: Tina Hernándezsrzulma Date & Time created: 2018  10:21 AM     Interval History:  Converted back to SR last night  Feels better this AM  Hemodynamically stable  Monitor occ PACs    Chief Complaint:  Abdominal pain  Consulted for new onset AF    Review of Systems   Constitutional: Negative for chills and fever.   HENT: Negative for congestion.    Eyes: Negative for blurred vision, pain and discharge.   Respiratory: Negative for cough, hemoptysis and wheezing.    Cardiovascular: Negative for chest pain and palpitations.   Gastrointestinal: Negative for nausea and vomiting.   Musculoskeletal: Negative for joint pain and myalgias.   Skin: Negative for itching and rash.   Neurological: Negative for speech change and loss of consciousness.   Endo/Heme/Allergies: Does not bruise/bleed easily.   Psychiatric/Behavioral: Negative for depression. The patient is not nervous/anxious.         Able to sleep better last night   All other systems reviewed and are negative.      Physical Exam   Constitutional: He is oriented to person, place, and time. No distress.   HENT:   Mouth/Throat: Mucous membranes are normal.   Eyes: Conjunctivae and EOM are normal.   Neck: No JVD present. No tracheal deviation present. No thyroid mass and no thyromegaly present.   Cardiovascular: Normal rate, regular rhythm and intact distal pulses.    No murmur heard.  Pulmonary/Chest: Effort normal and breath sounds normal. No respiratory distress. He exhibits no tenderness.   Abdominal: Soft. There is no tenderness.   Drain RLQ   Musculoskeletal: Normal range of motion. He exhibits no edema.   Neurological: He is alert and oriented to person, place, and time. He has normal strength. He displays no tremor.   Skin: Skin is warm and dry. He is not diaphoretic.   Psychiatric: He has a normal mood and affect. His behavior is normal.   Vitals reviewed.      Hemodynamics:  Temp (24hrs), Av.4 °C (97.6 °F),  Min:36.1 °C (97 °F), Max:36.9 °C (98.4 °F)  Temperature: 36.6 °C (97.8 °F)  Pulse  Av.1  Min: 74  Max: 151   Blood Pressure: 126/90     Respiratory:    Respiration: 17, Pulse Oximetry: 96 %           Fluids:  Date 18 0700 - 04/10/18 0659   Shift [id number] 2969-1681 5401-7596 24 Hour Total   I  N  T  A  K  E   Shift Total       O  U  T  P  U  T   Drains 0   0    Shift Total 0   0   Weight (kg) 111.2 111.2 111.2 111.2          GI/Nutrition:  Orders Placed This Encounter   Procedures   • DIET ORDER     Standing Status:   Standing     Number of Occurrences:   1     Order Specific Question:   Diet:     Answer:   Full Liquid [11]     Lab Results:  Recent Labs      18   0630  18   0447  18   0025   WBC  18.3*  14.8*  9.7   RBC  4.55*  4.63*  4.53*   HEMOGLOBIN  14.0  14.1  13.8*   HEMATOCRIT  40.0*  40.5*  39.8*   MCV  87.9  87.5  87.9   MCH  30.8  30.5  30.5   MCHC  35.0  34.8  34.7   RDW  40.2  40.3  41.1   PLATELETCT  227  271  278   MPV  9.0  9.1  8.9*     Recent Labs      18   0630  18   0447  18   0025   SODIUM  135  136  137   POTASSIUM  4.2  3.2*  3.4*   CHLORIDE  101  101  104   CO2  25  28  24   GLUCOSE  128*  124*  131*   BUN  26*  20  19   CREATININE  1.30  0.87  0.89   CALCIUM  8.3*  8.5  8.1*             Recent Labs      18   1510   TROPONINI  <0.01         ECHO: normal    Medical Decision Making, by Problem:  Active Hospital Problems    Diagnosis   • Acute gangrenous appendicitis with perforation and peritonitis [K35.3]   • Atrial fibrillation with rapid ventricular response (CMS-HCC) [I48.91] converted to sinus rhythm, on oral amiodarone   • History of hypertension [Z86.79] BP normotensive   Hypokalemia    Plan:  Continue current cardiac meds for now   K+ supplement  Will consider switching from amiodarone to IC agent    Quality-Core Measures

## 2018-04-09 NOTE — PROGRESS NOTES
Paged Dr Chase in regards to consulting with the patient's PCP, Dr Clayton.  Dr Chase states that he will not consult at this time.  Charge nurse and supervisor notified.

## 2018-04-09 NOTE — PROGRESS NOTES
Monitor room called to report a 1.2 second pause but patient continues to be in SR.  Cardiology notified, no new orders received.

## 2018-04-09 NOTE — PROGRESS NOTES
Surgical Progress Note    Author: Prashant Chase Date & Time created: 2018   10:17 AM     Interval Events:  Pain adequately controlled.  Feels bloated, not interested in solid food, desires change back to full liquid diet.  No output recorded from CLAUDE drain in last 24 hours,  No BM since 18  Back into and out of A flutter last night  Desires Benadryl to help him sleep.    Review of Systems   Gastrointestinal: Positive for abdominal pain and constipation. Negative for nausea and vomiting.     Hemodynamics:  Temp (24hrs), Av.4 °C (97.6 °F), Min:36.1 °C (97 °F), Max:36.9 °C (98.4 °F)  Temperature: 36.6 °C (97.8 °F)  Pulse  Av.1  Min: 74  Max: 151   Blood Pressure: 126/90     Respiratory:    Respiration: 17, Pulse Oximetry: 96 %           Neuro:  GCS = 15       Fluids:    Intake/Output Summary (Last 24 hours) at 18 1017  Last data filed at 18 0732   Gross per 24 hour   Intake              300 ml   Output              300 ml   Net                0 ml        Current Diet Order   Procedures   • DIET ORDER     Physical Exam   Constitutional: He is oriented to person, place, and time. He appears well-developed and well-nourished. No distress.   HENT:   Head: Normocephalic.   Eyes: Pupils are equal, round, and reactive to light. No scleral icterus.   Cardiovascular: Normal rate, regular rhythm and normal heart sounds.    Pulmonary/Chest: Effort normal and breath sounds normal. No respiratory distress.   Abdominal: Soft. He exhibits no distension. There is tenderness. There is no rebound and no guarding.   Decreased bowel sounds present.  NO output recorded from CLAUDE drain in last 24 hours, patient reports no one has emptied drain.  Minimal serous fluid in drain.   Musculoskeletal: He exhibits no edema.   Neurological: He is alert and oriented to person, place, and time.   Psychiatric: He has a normal mood and affect. His behavior is normal. Judgment and thought content normal.     Labs:  Recent  Results (from the past 24 hour(s))   ACCU-CHEK GLUCOSE    Collection Time: 04/08/18 11:53 AM   Result Value Ref Range    Glucose - Accu-Ck 123 (H) 65 - 99 mg/dL   ACCU-CHEK GLUCOSE    Collection Time: 04/08/18  5:23 PM   Result Value Ref Range    Glucose - Accu-Ck 130 (H) 65 - 99 mg/dL   ACCU-CHEK GLUCOSE    Collection Time: 04/08/18 11:53 PM   Result Value Ref Range    Glucose - Accu-Ck 114 (H) 65 - 99 mg/dL   CBC WITH DIFFERENTIAL    Collection Time: 04/09/18 12:25 AM   Result Value Ref Range    WBC 9.7 4.8 - 10.8 K/uL    RBC 4.53 (L) 4.70 - 6.10 M/uL    Hemoglobin 13.8 (L) 14.0 - 18.0 g/dL    Hematocrit 39.8 (L) 42.0 - 52.0 %    MCV 87.9 81.4 - 97.8 fL    MCH 30.5 27.0 - 33.0 pg    MCHC 34.7 33.7 - 35.3 g/dL    RDW 41.1 35.9 - 50.0 fL    Platelet Count 278 164 - 446 K/uL    MPV 8.9 (L) 9.0 - 12.9 fL    Nucleated RBC 0.00 /100 WBC    NRBC (Absolute) 0.00 K/uL    Neutrophils-Polys 80.30 (H) 44.00 - 72.00 %    Lymphocytes 11.60 (L) 22.00 - 41.00 %    Monocytes 2.70 0.00 - 13.40 %    Eosinophils 0.90 0.00 - 6.90 %    Basophils 0.00 0.00 - 1.80 %    Neutrophils (Absolute) 8.05 (H) 1.82 - 7.42 K/uL    Lymphs (Absolute) 1.13 1.00 - 4.80 K/uL    Monos (Absolute) 0.26 0.00 - 0.85 K/uL    Eos (Absolute) 0.09 0.00 - 0.51 K/uL    Baso (Absolute) 0.00 0.00 - 0.12 K/uL   BASIC METABOLIC PANEL    Collection Time: 04/09/18 12:25 AM   Result Value Ref Range    Sodium 137 135 - 145 mmol/L    Potassium 3.4 (L) 3.6 - 5.5 mmol/L    Chloride 104 96 - 112 mmol/L    Co2 24 20 - 33 mmol/L    Glucose 131 (H) 65 - 99 mg/dL    Bun 19 8 - 22 mg/dL    Creatinine 0.89 0.50 - 1.40 mg/dL    Calcium 8.1 (L) 8.5 - 10.5 mg/dL    Anion Gap 9.0 0.0 - 11.9   ESTIMATED GFR    Collection Time: 04/09/18 12:25 AM   Result Value Ref Range    GFR If African American >60 >60 mL/min/1.73 m 2    GFR If Non African American >60 >60 mL/min/1.73 m 2   DIFFERENTIAL MANUAL    Collection Time: 04/09/18 12:25 AM   Result Value Ref Range    Bands-Stabs 2.70 0.00 -  10.00 %    Metamyelocytes 0.90 %    Myelocytes 0.90 %    Manual Diff Status PERFORMED    PERIPHERAL SMEAR REVIEW    Collection Time: 04/09/18 12:25 AM   Result Value Ref Range    Peripheral Smear Review see below    PLATELET ESTIMATE    Collection Time: 04/09/18 12:25 AM   Result Value Ref Range    Plt Estimation Normal    MORPHOLOGY    Collection Time: 04/09/18 12:25 AM   Result Value Ref Range    RBC Morphology Normal    ACCU-CHEK GLUCOSE    Collection Time: 04/09/18  6:31 AM   Result Value Ref Range    Glucose - Accu-Ck 107 (H) 65 - 99 mg/dL     Medical Decision Making, by Problem:  Active Hospital Problems    Diagnosis   • Acute gangrenous appendicitis with perforation and peritonitis [K35.3]     Priority: High     Necrotic with stool emanating from gangrenous appendiceal orifice.  4/6 Open appendectomy, oversewn, drain placement.  4/7 Rocephin/Flagyl day 2 of 4.  Prashant Chase MD, Surgery.     • Atrial fibrillation with rapid ventricular response (CMS-HCC) [I48.91]     Priority: High     Noted intra-op with rate up to 180s per report.  Carvedilol restarted.  4/7 Recs:  - Transient AFL. Offered ablation, will defer to outpatient.  - If echo unremarkable, outpatient follow up with CV.  Humble Henry MD, Cardiology.     • History of hypertension [Z86.79]     Priority: Low     Premorbid condition treated with Valsartan.  4/6 Home medication resumed.       Plan:  Continue with K, to replace K.    D/C drain.  Start q AM Dulcolax suppository, Tylenol for pain, Benadryl to help with sleep.  Follow up Cards recommendations.  IS to bedside, instruct in use and encourage use ten times per hour when awake.  Full liquid diet today.  AM BMP tomorrow.    Quality Measures:  Quality-Core Measures   Reviewed items::  Labs reviewed and Medications reviewed  Yang catheter::  No Yang  DVT prophylaxis pharmacological::  Enoxaparin (Lovenox)  Ulcer Prophylaxis::  Yes  Antibiotics:  Treating active infection/contamination  beyond 24 hours perioperative coverage      Discussed patient condition with RN and Patient

## 2018-04-09 NOTE — PROGRESS NOTES
"Assumed care of patient from night shift RN.  Patient is alert and oriented times 4, states pain of 3/10, declines intervention at this time.  VSS /90   Pulse 79   Temp 36.6 °C (97.8 °F)   Resp 17   Ht 1.88 m (6' 2\")   Wt 111.2 kg (245 lb 2.4 oz)   SpO2 96%   BMI 31.48 kg/m²   Tele monitoring, history of A flutter.  Wound vac to the RLQ, CLAUDE drain to the RLQ.  PIV in the L upper arm, patent and running NS with 40K at 75mL/hr.  GI soft diet, tolerating well.  Last BM 4/7, will contact MD for bowel protocol orders.  Patient is up self, demonstrates steady gait and able to ambulate independently.  POC discussed for the day, bed is locked and in the lowest position, call light is within reach.  All needs are met at this time, hourly rounding is in place.  "

## 2018-04-10 VITALS
RESPIRATION RATE: 16 BRPM | HEART RATE: 81 BPM | BODY MASS INDEX: 31.46 KG/M2 | SYSTOLIC BLOOD PRESSURE: 135 MMHG | DIASTOLIC BLOOD PRESSURE: 92 MMHG | TEMPERATURE: 97.9 F | WEIGHT: 245.15 LBS | OXYGEN SATURATION: 96 % | HEIGHT: 74 IN

## 2018-04-10 LAB
ANION GAP SERPL CALC-SCNC: 7 MMOL/L (ref 0–11.9)
BASOPHILS # BLD AUTO: 0 % (ref 0–1.8)
BASOPHILS # BLD: 0 K/UL (ref 0–0.12)
BUN SERPL-MCNC: 12 MG/DL (ref 8–22)
CALCIUM SERPL-MCNC: 8.4 MG/DL (ref 8.5–10.5)
CHLORIDE SERPL-SCNC: 105 MMOL/L (ref 96–112)
CO2 SERPL-SCNC: 25 MMOL/L (ref 20–33)
CREAT SERPL-MCNC: 0.86 MG/DL (ref 0.5–1.4)
EOSINOPHIL # BLD AUTO: 0 K/UL (ref 0–0.51)
EOSINOPHIL NFR BLD: 0 % (ref 0–6.9)
ERYTHROCYTE [DISTWIDTH] IN BLOOD BY AUTOMATED COUNT: 41.3 FL (ref 35.9–50)
GLUCOSE SERPL-MCNC: 140 MG/DL (ref 65–99)
HCT VFR BLD AUTO: 41.7 % (ref 42–52)
HGB BLD-MCNC: 14 G/DL (ref 14–18)
LYMPHOCYTES # BLD AUTO: 0.8 K/UL (ref 1–4.8)
LYMPHOCYTES NFR BLD: 7.9 % (ref 22–41)
MANUAL DIFF BLD: NORMAL
MCH RBC QN AUTO: 30.1 PG (ref 27–33)
MCHC RBC AUTO-ENTMCNC: 33.6 G/DL (ref 33.7–35.3)
MCV RBC AUTO: 89.7 FL (ref 81.4–97.8)
MONOCYTES # BLD AUTO: 0.17 K/UL (ref 0–0.85)
MONOCYTES NFR BLD AUTO: 1.7 % (ref 0–13.4)
MORPHOLOGY BLD-IMP: NORMAL
MYELOCYTES NFR BLD MANUAL: 2.6 %
NEUTROPHILS # BLD AUTO: 8.78 K/UL (ref 1.82–7.42)
NEUTROPHILS NFR BLD: 85.1 % (ref 44–72)
NEUTS BAND NFR BLD MANUAL: 1.8 % (ref 0–10)
NRBC # BLD AUTO: 0 K/UL
NRBC BLD-RTO: 0 /100 WBC
PLATELET # BLD AUTO: 322 K/UL (ref 164–446)
PLATELET BLD QL SMEAR: NORMAL
PMV BLD AUTO: 8.3 FL (ref 9–12.9)
POTASSIUM SERPL-SCNC: 3.7 MMOL/L (ref 3.6–5.5)
PROMYELOCYTES NFR BLD MANUAL: 0.9 %
RBC # BLD AUTO: 4.65 M/UL (ref 4.7–6.1)
RBC BLD AUTO: NORMAL
SODIUM SERPL-SCNC: 137 MMOL/L (ref 135–145)
WBC # BLD AUTO: 10.1 K/UL (ref 4.8–10.8)

## 2018-04-10 PROCEDURE — A9270 NON-COVERED ITEM OR SERVICE: HCPCS | Performed by: SURGERY

## 2018-04-10 PROCEDURE — 700102 HCHG RX REV CODE 250 W/ 637 OVERRIDE(OP): Performed by: INTERNAL MEDICINE

## 2018-04-10 PROCEDURE — 36415 COLL VENOUS BLD VENIPUNCTURE: CPT

## 2018-04-10 PROCEDURE — A9270 NON-COVERED ITEM OR SERVICE: HCPCS | Performed by: INTERNAL MEDICINE

## 2018-04-10 PROCEDURE — 700101 HCHG RX REV CODE 250: Performed by: SURGERY

## 2018-04-10 PROCEDURE — 80048 BASIC METABOLIC PNL TOTAL CA: CPT

## 2018-04-10 PROCEDURE — 97605 NEG PRS WND THER DME<=50SQCM: CPT

## 2018-04-10 PROCEDURE — 85027 COMPLETE CBC AUTOMATED: CPT

## 2018-04-10 PROCEDURE — 700102 HCHG RX REV CODE 250 W/ 637 OVERRIDE(OP): Performed by: SURGERY

## 2018-04-10 PROCEDURE — 85007 BL SMEAR W/DIFF WBC COUNT: CPT

## 2018-04-10 PROCEDURE — 700111 HCHG RX REV CODE 636 W/ 250 OVERRIDE (IP): Performed by: SURGERY

## 2018-04-10 RX ORDER — AMIODARONE HYDROCHLORIDE 200 MG/1
400 TABLET ORAL
Status: DISCONTINUED | OUTPATIENT
Start: 2018-04-11 | End: 2018-04-10 | Stop reason: HOSPADM

## 2018-04-10 RX ORDER — POTASSIUM CHLORIDE 20 MEQ/1
20 TABLET, EXTENDED RELEASE ORAL DAILY
Qty: 30 TAB | Refills: 0 | Status: SHIPPED | OUTPATIENT
Start: 2018-04-10

## 2018-04-10 RX ORDER — AMOXICILLIN AND CLAVULANATE POTASSIUM 875; 125 MG/1; MG/1
1 TABLET, FILM COATED ORAL 2 TIMES DAILY
Qty: 6 TAB | Refills: 0 | Status: SHIPPED | OUTPATIENT
Start: 2018-04-10 | End: 2018-04-13

## 2018-04-10 RX ORDER — AMOXICILLIN AND CLAVULANATE POTASSIUM 875; 125 MG/1; MG/1
1 TABLET, FILM COATED ORAL 2 TIMES DAILY
Qty: 6 TAB | Refills: 0 | Status: SHIPPED | OUTPATIENT
Start: 2018-04-10

## 2018-04-10 RX ORDER — AMIODARONE HYDROCHLORIDE 400 MG/1
400 TABLET ORAL DAILY
Qty: 30 TAB | Refills: 0 | Status: SHIPPED | OUTPATIENT
Start: 2018-04-11 | End: 2018-04-17

## 2018-04-10 RX ORDER — OXYCODONE HYDROCHLORIDE AND ACETAMINOPHEN 5; 325 MG/1; MG/1
1-2 TABLET ORAL EVERY 4 HOURS PRN
Qty: 35 TAB | Refills: 0 | Status: SHIPPED | OUTPATIENT
Start: 2018-04-10 | End: 2018-04-15

## 2018-04-10 RX ADMIN — CARVEDILOL 6.25 MG: 6.25 TABLET, FILM COATED ORAL at 17:32

## 2018-04-10 RX ADMIN — POTASSIUM CHLORIDE 20 MEQ: 1500 TABLET, EXTENDED RELEASE ORAL at 07:53

## 2018-04-10 RX ADMIN — VALSARTAN 160 MG: 80 TABLET ORAL at 07:52

## 2018-04-10 RX ADMIN — OMEPRAZOLE 20 MG: 20 CAPSULE, DELAYED RELEASE ORAL at 07:52

## 2018-04-10 RX ADMIN — CARVEDILOL 6.25 MG: 6.25 TABLET, FILM COATED ORAL at 07:52

## 2018-04-10 RX ADMIN — ENOXAPARIN SODIUM 40 MG: 100 INJECTION SUBCUTANEOUS at 07:53

## 2018-04-10 RX ADMIN — AMIODARONE HYDROCHLORIDE 400 MG: 200 TABLET ORAL at 07:52

## 2018-04-10 RX ADMIN — ASPIRIN 325 MG: 325 TABLET, COATED ORAL at 07:52

## 2018-04-10 RX ADMIN — POTASSIUM CHLORIDE AND SODIUM CHLORIDE: 900; 300 INJECTION, SOLUTION INTRAVENOUS at 08:11

## 2018-04-10 RX ADMIN — ACETAMINOPHEN 650 MG: 325 TABLET, FILM COATED ORAL at 07:53

## 2018-04-10 RX ADMIN — ACETAMINOPHEN 650 MG: 325 TABLET, FILM COATED ORAL at 13:14

## 2018-04-10 RX ADMIN — METRONIDAZOLE 500 MG: 500 INJECTION, SOLUTION INTRAVENOUS at 06:36

## 2018-04-10 ASSESSMENT — PAIN SCALES - GENERAL
PAINLEVEL_OUTOF10: 2

## 2018-04-10 ASSESSMENT — ENCOUNTER SYMPTOMS
SPEECH CHANGE: 0
VOMITING: 0
PALPITATIONS: 0
ABDOMINAL PAIN: 0
DEPRESSION: 0
EYE DISCHARGE: 0
LOSS OF CONSCIOUSNESS: 0
MYALGIAS: 0
HEMOPTYSIS: 0
NERVOUS/ANXIOUS: 0
WHEEZING: 0
BRUISES/BLEEDS EASILY: 0
FEVER: 0
EYE PAIN: 0
NAUSEA: 0
CHILLS: 0
BLURRED VISION: 0
COUGH: 0

## 2018-04-10 NOTE — DISCHARGE INSTRUCTIONS
Discharge Instructions    Discharged to home by car with relative. Discharged via walking, hospital escort: Yes.  Special equipment needed: Wound VAC    Be sure to schedule a follow-up appointment with your primary care doctor or any specialists as instructed.     Discharge Plan:   Influenza Vaccine Indication: Patient Refuses    I understand that a diet low in cholesterol, fat, and sodium is recommended for good health. Unless I have been given specific instructions below for another diet, I accept this instruction as my diet prescription.   Other diet: regular    Special Instructions: None    · Is patient discharged on Warfarin / Coumadin?   No     Depression / Suicide Risk    As you are discharged from this [facility] facility, it is important to learn how to keep safe from harming yourself.    Recognize the warning signs:  · Abrupt changes in personality, positive or negative- including increase in energy   · Giving away possessions  · Change in eating patterns- significant weight changes-  positive or negative  · Change in sleeping patterns- unable to sleep or sleeping all the time   · Unwillingness or inability to communicate  · Depression  · Unusual sadness, discouragement and loneliness  · Talk of wanting to die  · Neglect of personal appearance   · Rebelliousness- reckless behavior  · Withdrawal from people/activities they love  · Confusion- inability to concentrate     If you or a loved one observes any of these behaviors or has concerns about self-harm, here's what you can do:  · Talk about it- your feelings and reasons for harming yourself  · Remove any means that you might use to hurt yourself (examples: pills, rope, extension cords, firearm)  · Get professional help from the community (Mental Health, Substance Abuse, psychological counseling)  · Do not be alone:Call your Safe Contact- someone whom you trust who will be there for you.  · Call your local CRISIS HOTLINE 797-7244 or 396-848-3518  · Call  your local Children's Mobile Crisis Response Team [place] (091) 619-6967 or www.[employer]  · Call the toll free National Suicide Prevention Hotlines   · National Suicide Prevention Lifeline 472-125-AVRO (6955)  · [employer] Line Network 800-SUICIDE (391-4031)      Appendicitis  The appendix is a finger-shaped tube that is attached to the large intestine. Appendicitis is inflammation of the appendix. Without treatment, appendicitis can cause the appendix to tear (rupture). A ruptured appendix can lead to a life-threatening infection. It can also lead to the formation of a painful collection of pus (abscess) in the appendix.  What are the causes?  This condition may be caused by a blockage in the appendix that leads to infection. The blockage can be due to:  · A ball of stool.  · Enlarged lymph glands.  In some cases, the cause may not be known.  What increases the risk?  This condition is more likely to develop in people who are 10-30 years of age.  What are the signs or symptoms?  Symptoms of this condition include:  · Pain around the belly button that moves toward the lower right abdomen. The pain can become more severe as time passes. It gets worse with coughing or sudden movements.  · Tenderness in the lower right abdomen.  · Nausea.  · Vomiting.  · Loss of appetite.  · Fever.  · Constipation.  · Diarrhea.  · Generally not feeling well.  How is this diagnosed?  This condition may be diagnosed with:  · A physical exam.  · Blood tests.  · Urine test.  To confirm the diagnosis, an ultrasound, MRI, or CT scan may be done.  How is this treated?  This condition is usually treated by taking out the appendix (appendectomy). There are two methods for doing an appendectomy:  · Open appendectomy. In this surgery, the appendix is removed through a large cut (incision) that is made in the lower right abdomen. This procedure may be recommended if:  ¨ You have major scarring from a previous surgery.  ¨ You have  a bleeding disorder.  ¨ You are pregnant and are near term.  ¨ You have a condition that makes the laparoscopic procedure impossible, such as an advanced infection or a ruptured appendix.  · Laparoscopic appendectomy. In this surgery, the appendix is removed through small incisions. This procedure usually causes less pain and fewer problems than an open appendectomy. It also has a shorter recovery time.  If the appendix has ruptured and an abscess has formed, a drain may be placed into the abscess to remove fluid and antibiotic medicines may be given through an IV tube. The appendix may or may not need to be removed.  This information is not intended to replace advice given to you by your health care provider. Make sure you discuss any questions you have with your health care provider.  Document Released: 12/18/2006 Document Revised: 04/26/2017 Document Reviewed: 05/04/2016  [employer] Interactive Patient Education © 2017 [employer]    Vacuum-Assisted Closure Therapy Home Guide  Vacuum-assisted closure therapy (VAC therapy) is a device that helps wounds heal. It is used on wounds that cannot be closed with stitches. They often heal slowly. VAC therapy helps the wound stay clean and healthy while its edges slowly grow back together.  VAC therapy uses a bandage (dressing) that is made of foam. It is put inside the wound. Then, a drape is placed over the wound. This drape sticks to your skin (adhesive) to keep air out. A tube is hooked up to a small pump and is attached to the drape. The pump sucks fluid and germs from the wound. It can also decrease any bad smell that comes from the wound.  RISKS AND COMPLICATIONS  VAC therapy is usually safe to use at home. Your skin may get sore from the adhesive drape. That is the most common problem. However, more serious problems can develop, such as:   · Bleeding. This can happen if the dressing in the wound comes into contact with blood vessels. A little bleeding may occur  when the dressing is being changed. This is normal now and then. Major bleeding can happen if a large blood vessel breaks. This is more likely if you are taking blood-thinning medicine. Emergency surgery may be needed.  · Infection. This can happen if the dressing has an air leak that is not repaired within a couple of hours.  · Dehydration. This can happen if the pump sucks out too much body fluid.  DRESSING CHANGES  Your dressing will have to be changed. Sometimes this is needed once a day. Other times, a dressing change must be done 3 times a week. How often you change your dressing will depend on what your wound is like. A trained caregiver will most likely change the dressing. However, a family member or friend may be trained to change the dressing. Below are steps to change a dressing in order to prevent an infection. The steps apply to you or the person that changes your dressing.  · Wash your hands with soap and water before and after each dressing change.  · Wear gloves and protective clothing. This may include eye protection.  · Do not allow anyone to change your dressing if they have an infection or a skin condition. Even a small cut can be a problem.  To change the dressing:   · Turn off the pump.  · Take off the adhesive drape.  · Disconnect the tube from the dressing.  · Take out the dressing that is inside the wound. If the dressing sticks, use a germ-free (sterile), saltwater solution to wet the dressing. This helps it come out more easily. If it hurts when the dressing is changed, take pain medicine 30 minutes before the dressing change.  · Cleanse the wound with normal saline or sterile water.  · Apply a skin barrier film to the skin that will be covered with the drape. This will protect the skin.  · Put a new dressing into the wound.  · Apply a new drape and tube.  · Replace the container in the pump that collects fluid if it is full. Do this at least once per week.  · Turn the pump back  on.  · Your doctor will decide what setting of suction is best. Do not change the settings on the machine without talking to your nurse or doctor.  HOME CARE INSTRUCTIONS   · The VAC pump has an alarm. It goes off if there are any problems such as a leak.  ¨ Ask your caregiver what to do if the alarm goes off.  ¨ Call your caregiver right away if the alarm goes off and you cannot fix the problem.  · Do not turn off the pump for more than 2 hours.  · Check your wound carefully at each dressing change for signs of infection. Watch for redness, swelling, or any fluid leaking from the wound. If you develop an infection:  ¨ You may have to stop VAC therapy.  ¨ The wound will need to be cleaned and washed out.  ¨ You will have to take antibiotic medicine.  · Ask your caregiver what activities you should or should not do while you are getting VAC therapy. This will depend on your particular wound.  · Ask if it is okay to turn off the pump so you can take a shower. If it is okay, make sure the wound is covered with plastic. The wound area must stay dry.  · Drink enough fluids to keep your urine clear or pale yellow.  · Eat foods that contain a lot of protein. Examples are meat, poultry, seafood, eggs, nuts, beans, and peas. Protein can help your wound heal.  SEEK MEDICAL CARE IF:  · Your wound itches or hurts.  · Dressing changes are often painful or bleeding often occurs.  · You have a headache.  · You have diarrhea.  · You have a sore throat.  · You have a rash.  · You feel nauseous.  · You feel dizzy or weak.  SEEK IMMEDIATE MEDICAL CARE IF:   · You have very bad pain.  · You have bleeding that will not stop.  · Your wound smells bad.  · You have redness, swelling, or fluid leaking from your wound.  · Your alarm goes off and you do not know what to do.  · You have a fever.  This information is not intended to replace advice given to you by your health care provider. Make sure you discuss any questions you have with your  health care provider.  Document Released: 03/11/2013 Document Reviewed: 09/22/2016  [employer] Interactive Patient Education © 2017 [employer]    Open Appendectomy, Care After  Refer to this sheet in the next few weeks. These instructions provide you with information on caring for yourself after your procedure. Your caregiver may also give you more specific instructions. Your treatment has been planned according to current medical practices, but problems sometimes occur. Call your caregiver if you have any problems or questions after your procedure.  HOME CARE INSTRUCTIONS   · Do not drive while taking narcotic pain medicines.  · Use stool softener if you become constipated from your pain medicines.  · Change your bandages (dressings) as directed.  · Keep your wounds clean and dry. You may wash the wounds gently with soap and water. Gently pat the wounds dry with a clean towel.  · Do not take baths, swim, or use hot tubs for 10 days, or as instructed by your caregiver.  · Only take over-the-counter or prescription medicines for pain, discomfort, or fever as directed by your caregiver.  · You may continue your normal diet as directed.  · Do not lift more than 10 pounds (4.5 kg) or play contact sports for 3 weeks, or as directed.  · Slowly increase your activity after surgery.  · Take deep breaths to avoid getting a lung infection (pneumonia).  SEEK MEDICAL CARE IF:   · You have redness, swelling, or increasing pain in your wounds.  · You have pus coming from your wounds.  · You have drainage from a wound that lasts longer than 1 day.  · You notice a bad smell coming from the wounds or dressing.  · Your wound edges break open after stitches (sutures) have been removed.  · You notice increasing pain in the shoulders (shoulder strap areas) or near your shoulder blades.  · You develop dizzy episodes or fainting while standing.  · You develop shortness of breath.  · You develop persistent nausea or vomiting.  · You  cannot control your bowel functions or lose your appetite.  · You develop diarrhea.  SEEK IMMEDIATE MEDICAL CARE IF:   · You have a fever.  · You develop a rash.  · You have difficulty breathing or chest pain.  · You develop any reaction or side effects to medicines given.  MAKE SURE YOU:  · Understand these instructions.  · Will watch your condition.  · Will get help right away if you are not doing well or get worse.     This information is not intended to replace advice given to you by your health care provider. Make sure you discuss any questions you have with your health care provider.     Document Released: 08/01/2005 Document Revised: 01/08/2016 Document Reviewed: 06/26/2012  [employer] Interactive Patient Education ©2016 [employer]    No lifting greater than 20 pounds for 4 weeks.   Use over the counter laxative of choice if constipated.   Okay to shower but no baths, hot tubs, or swimming until follow up appointment.   Follow up with Dr. Chase ([facility], [phone]) in 7-10 days.

## 2018-04-10 NOTE — CARE PLAN
Problem: Safety  Goal: Will remain free from injury  Outcome: PROGRESSING AS EXPECTED  Encouraged patient to use call light    Problem: Pain Management  Goal: Pain level will decrease to patient's comfort goal  Outcome: PROGRESSING AS EXPECTED  Will medicate per MAR prn

## 2018-04-10 NOTE — CARE PLAN
Problem: Safety  Goal: Will remain free from falls  Outcome: PROGRESSING AS EXPECTED  Patient educated on the importance of calling a staff member before getting out of bed. Patient verbalizes understanding and patient calling appropriately. Bed in lowest position, call light and other belongings within reach, treaded socks on, and hourly rounding in place.     Problem: Venous Thromboembolism (VTW)/Deep Vein Thrombosis (DVT) Prevention:  Goal: Patient will participate in Venous Thrombosis (VTE)/Deep Vein Thrombosis (DVT)Prevention Measures  Outcome: PROGRESSING AS EXPECTED  SCD's on at night. Pt ambulating during the day

## 2018-04-10 NOTE — PROGRESS NOTES
"Assumed care of patient from night shift RN.  Patient is alert and oriented times 4, states pain of 2/10, medicated with Tylenol per patient request.  VSS /90   Pulse 81   Temp 36.5 °C (97.7 °F)   Resp 16   Ht 1.88 m (6' 2\")   Wt 111.2 kg (245 lb 2.4 oz)   SpO2 95%   BMI 31.48 kg/m²   PIV in the L upper arm, patent and running NS with 40K at 75mL/hr.  Patient on tele monitoring, no events overnight.  Wound vac to the RLQ, vac functioning correctly.  Minimal output.  Dressing to the RLQ from CLAUDE drain removal, dressing CDI.  Full liquid diet, tolerating well.  Denies n/v.  Patient would like to try to advance diet today, will speak with MD.  Last BM this AM.  Urinating without difficulty.  Patient is up self, demonstrates steady gait, no assistance needed.  Patient encouraged to ambulate frequently.  IS at 2000, encouraged to continue this 10X an hour.  POC discussed for the day, bed is locked and in the lowest position, call light is within reach.  All needs are met at this time, hourly rounding is in place.  "

## 2018-04-10 NOTE — PROGRESS NOTES
Surgical Progress Note    Author: Prashant Chase Date & Time created: 4/10/2018   9:21 AM     Interval Events:  In SR.  Cards has seen this morning, cleard patient for discharge with PO amiodarone, follow up with them in 1 week.  Tolerating diet, having BMs.  Ambulating.  Pain well controlled.  Awaiting home wound vac set tup.    Review of Systems   Gastrointestinal: Negative for nausea and vomiting.     Hemodynamics:  Temp (24hrs), Av.8 °C (98.2 °F), Min:36.4 °C (97.5 °F), Max:37.4 °C (99.4 °F)  Temperature: 36.7 °C (98 °F)  Pulse  Av.7  Min: 74  Max: 151   Blood Pressure: 132/92     Respiratory:    Respiration: 16, Pulse Oximetry: 96 %           Neuro:  GCS = 15       Fluids:    Intake/Output Summary (Last 24 hours) at 04/10/18 0921  Last data filed at 04/10/18 0500   Gross per 24 hour   Intake             2410 ml   Output            757.5 ml   Net           1652.5 ml        Current Diet Order   Procedures   • DIET ORDER     Physical Exam   Constitutional: He is oriented to person, place, and time. He appears well-developed and well-nourished. No distress.   HENT:   Head: Normocephalic.   Eyes: Pupils are equal, round, and reactive to light. No scleral icterus.   Cardiovascular: Normal rate, regular rhythm and normal heart sounds.    Pulmonary/Chest: Effort normal and breath sounds normal. No respiratory distress.   Abdominal: Soft. Bowel sounds are normal. He exhibits no distension. There is tenderness (at RLQ wound, minimal). There is no rebound and no guarding.   Neurological: He is alert and oriented to person, place, and time.   Skin: Skin is warm and dry.   Psychiatric: He has a normal mood and affect. His behavior is normal. Judgment and thought content normal.     Labs:  Recent Results (from the past 24 hour(s))   ACCU-CHEK GLUCOSE    Collection Time: 18 11:29 AM   Result Value Ref Range    Glucose - Accu-Ck 117 (H) 65 - 99 mg/dL   ACCU-CHEK GLUCOSE    Collection Time: 18  5:20 PM    Result Value Ref Range    Glucose - Accu-Ck 103 (H) 65 - 99 mg/dL   CBC WITH DIFFERENTIAL    Collection Time: 04/10/18  6:57 AM   Result Value Ref Range    WBC 10.1 4.8 - 10.8 K/uL    RBC 4.65 (L) 4.70 - 6.10 M/uL    Hemoglobin 14.0 14.0 - 18.0 g/dL    Hematocrit 41.7 (L) 42.0 - 52.0 %    MCV 89.7 81.4 - 97.8 fL    MCH 30.1 27.0 - 33.0 pg    MCHC 33.6 (L) 33.7 - 35.3 g/dL    RDW 41.3 35.9 - 50.0 fL    Platelet Count 322 164 - 446 K/uL    MPV 8.3 (L) 9.0 - 12.9 fL    Nucleated RBC 0.00 /100 WBC    NRBC (Absolute) 0.00 K/uL   BASIC METABOLIC PANEL    Collection Time: 04/10/18  6:57 AM   Result Value Ref Range    Sodium 137 135 - 145 mmol/L    Potassium 3.7 3.6 - 5.5 mmol/L    Chloride 105 96 - 112 mmol/L    Co2 25 20 - 33 mmol/L    Glucose 140 (H) 65 - 99 mg/dL    Bun 12 8 - 22 mg/dL    Creatinine 0.86 0.50 - 1.40 mg/dL    Calcium 8.4 (L) 8.5 - 10.5 mg/dL    Anion Gap 7.0 0.0 - 11.9   ESTIMATED GFR    Collection Time: 04/10/18  6:57 AM   Result Value Ref Range    GFR If African American >60 >60 mL/min/1.73 m 2    GFR If Non African American >60 >60 mL/min/1.73 m 2     Medical Decision Making, by Problem:  Active Hospital Problems    Diagnosis   • Acute gangrenous appendicitis with perforation and peritonitis [K35.3]     Priority: High     Necrotic with stool emanating from gangrenous appendiceal orifice.  4/6 Open appendectomy, oversewn, drain placement.  4/7 Rocephin/Flagyl day 2 of 4.  Prashant Chase MD, Surgery.     • Atrial fibrillation with rapid ventricular response (CMS-HCC) [I48.91]     Priority: High     Noted intra-op with rate up to 180s per report.  Carvedilol restarted.  4/7 Recs:  - Transient AFL. Offered ablation, will defer to outpatient.  - If echo unremarkable, outpatient follow up with CV.  Humble Henry MD, Cardiology.     • History of hypertension [Z86.79]     Priority: Low     Premorbid condition treated with Valsartan.  4/6 Home medication resumed.       Plan:  Home today, if possible,  today (depending on whether home wound vac, care set up).  To be sent home with Rx's for Amiodarone, K, Augmentin, Percocet  Hep lock IV now, start oral K.    Quality Measures:  Quality-Core Measures   Reviewed items::  Labs reviewed and Medications reviewed  Yang catheter::  No Yang  DVT prophylaxis pharmacological::  Enoxaparin (Lovenox)  DVT prophylaxis - mechanical:  SCDs  Ulcer Prophylaxis::  Yes  Antibiotics:  Treating active infection/contamination beyond 24 hours perioperative coverage      Discussed patient condition with RN, Patient and cardiology, and Discharge Planning

## 2018-04-10 NOTE — DISCHARGE SUMMARY
DATE OF ADMISSION:  04/06/2018    DATE OF DISCHARGE:  04/08/2018    DIAGNOSES ON ADMISSION:  1.  Acute appendicitis.  2.  Hypertension.    DIAGNOSES ON DISCHARGE:  1.  Acute appendicitis.  2.  Hypertension.  3.  Atrial flutter.    HISTORY AND PHYSICAL:  Please see the dictated history and physical.    HOSPITAL COURSE:  Patient was admitted to my service and taken to the   operating room floor for a planned open appendectomy.  He converted into   atrial flutter in the preop holding area.  He had a consult to cardiology   performed and was sent to the ICU postoperatively.  Please see the dictated   operative note for details of his operative procedure.  He did convert back to   sinus rhythm and was sent out of the ICU after 2 days there, he converted   back into atrial flutter and then back out.  He was followed during his   hospitalization by the cardiology service and eventually was deemed able to be   discharged home on amiodarone 400 mg p.o. daily with the plan for followup   with cardiology in 1 week.  He had a wound VAC placed.  By the date of his   discharge, he was tolerating a regular diet, he was ambulating, he was noted   to have a benign exam, and he was deemed ready for discharge home with a wound   VAC in place.    DISCHARGE RECOMMENDATIONS:  He is to follow up with me in 7-10 days.  He is to   refrain from lifting greater than 20 pounds for 4 weeks.  He may shower, but   is to not sit in a bathtub, hot tub, or go swimming until his followup   appointment.  He is to take his prescribed medications.  He is to follow up   with cardiology as set up by them.    DISCHARGE MEDICATIONS:  Amiodarone 400 mg 1 p.o. daily, oxycodone 5/325   one-to-two p.o. q. 4 hours p.r.n. pain, potassium chloride 20 mEq p.o. daily,   and Augmentin 875/125 one p.o. b.i.d. for 3 additional days.       ____________________________________     MD JOAQUÍN Hall / ARIK    DD:  04/10/2018 09:37:51  DT:  04/10/2018  09:52:21    D#:  [id number]  Job#:  [id number]

## 2018-04-10 NOTE — DISCHARGE PLANNING
Referral: Outpatient Wound Care    Intervention: Marylee, Wound RN informed RENETTA, pt is appropriate to have the Wound Vac change Friday April 13th if a appointment is not available sooner.      RENETTA spoke with Yoly at the [facility].  Per the appointment is schedule tomorrow Wednesday April 11th at 2:45pm, RN notified via phone.    Brigette, with [employer] notified Kwame JACKSON has authorized the wound vac and it will be release to pt's room in approximately 4 hours.    Plan: Home with Wound Vac and Outpatient Wound Care, pending the release of the wound vac.

## 2018-04-10 NOTE — DISCHARGE PLANNING
Referral: Wound Vac    Intervention: Received notification of discharge with Wound Vac and Outpatient Wound Care for Wound Vac Dressing Changes.  RENETTA provided the [employer] Form and pertinent clinical information to Brigette with [facility].  Per Brigette, the wound vac might be release today.    RENETTA called the [facility], message left requesting a call back with appointment information.    Plan: As Above.  Home with Wound Vac and Outpatient Dressing Changes, pending the release of the home wound vac and appointment.

## 2018-04-10 NOTE — DISCHARGE PLANNING
Referral: Update    Intervention: Anna with [employer] notified Unit SW, Wound Vac will be deliver today to pt's room.    Plan: Home with Wound Vac and Outpatient Wound Vac Dressing Changes.

## 2018-04-10 NOTE — PROGRESS NOTES
Cardiology Progress Note               Author: Tina Nunez Date & Time created: 4/10/2018  8:56 AM     Interval History:  The patient is doing well from cardiac standpoint.   He denies any chest pain, palpitation or heart failure type symptoms.  Denies any side effect from medications.    -130 systolic  Monitor reviewed by me showed no significant ventricular or atrial dysrhythmias.      Chief Complaint:  Consulted for AF/flutter    Review of Systems   Constitutional: Negative for chills and fever.   HENT: Negative for congestion.    Eyes: Negative for blurred vision, pain and discharge.   Respiratory: Negative for cough, hemoptysis and wheezing.    Cardiovascular: Negative for chest pain and palpitations.   Gastrointestinal: Negative for abdominal pain, nausea and vomiting.   Musculoskeletal: Negative for joint pain and myalgias.   Skin: Negative for itching and rash.   Neurological: Negative for speech change and loss of consciousness.   Endo/Heme/Allergies: Does not bruise/bleed easily.   Psychiatric/Behavioral: Negative for depression. The patient is not nervous/anxious.    All other systems reviewed and are negative.      Physical Exam   Constitutional: He is oriented to person, place, and time. He appears well-developed. No distress.   HENT:   Mouth/Throat: Mucous membranes are normal.   Eyes: Conjunctivae and EOM are normal.   Neck: No JVD present. No tracheal deviation present. No thyroid mass and no thyromegaly present.   Cardiovascular: Normal rate, regular rhythm and intact distal pulses.    No murmur heard.  Pulmonary/Chest: Effort normal and breath sounds normal. No respiratory distress. He exhibits no tenderness.   Abdominal: Soft. There is no tenderness.   Drain RLQ   Musculoskeletal: Normal range of motion. He exhibits no edema.   Neurological: He is alert and oriented to person, place, and time. He has normal strength. He displays no tremor.   Skin: Skin is warm and dry. He is not  diaphoretic.   Psychiatric: He has a normal mood and affect. His behavior is normal.   Vitals reviewed.      Hemodynamics:  Temp (24hrs), Av.8 °C (98.2 °F), Min:36.4 °C (97.5 °F), Max:37.4 °C (99.4 °F)  Temperature: 36.7 °C (98 °F)  Pulse  Av.7  Min: 74  Max: 151   Blood Pressure: 132/92     Respiratory:    Respiration: 16, Pulse Oximetry: 96 %           Fluids:        GI/Nutrition:  Orders Placed This Encounter   Procedures   • DIET ORDER     Standing Status:   Standing     Number of Occurrences:   1     Order Specific Question:   Diet:     Answer:   Full Liquid [11]     Comments:   No coffee, Mint tea please     Lab Results:  Recent Labs      04/08/18   0447  04/09/18   0025  04/10/18   06   WBC  14.8*  9.7  10.1   RBC  4.63*  4.53*  4.65*   HEMOGLOBIN  14.1  13.8*  14.0   HEMATOCRIT  40.5*  39.8*  41.7*   MCV  87.5  87.9  89.7   MCH  30.5  30.5  30.1   MCHC  34.8  34.7  33.6*   RDW  40.3  41.1  41.3   PLATELETCT  271  278  322   MPV  9.1  8.9*  8.3*     Recent Labs      04/08/18   0447  04/09/18   0025  04/10/18   0657   SODIUM  136  137  137   POTASSIUM  3.2*  3.4*  3.7   CHLORIDE  101  104  105   CO2  28  24  25   GLUCOSE  124*  131*  140*   BUN  20  19  12   CREATININE  0.87  0.89  0.86   CALCIUM  8.5  8.1*  8.4*                         Medical Decision Making, by Problem:  Active Hospital Problems    Diagnosis   • Acute gangrenous appendicitis with perforation and peritonitis [K35.3]   • Atrial fibrillation with rapid ventricular response (CMS-HCC) [I48.91] in SR on amiodarone   • History of hypertension [Z86.79] controlled       Plan:  Will reduce amiodarone to 400 mg/d  Continue other current cardiac/BP meds  May d/c from our standpoint  Will see in f/u in a week    Quality-Core Measures

## 2018-04-10 NOTE — PROGRESS NOTES
"Report received bedside. Friend at bedside. Pt is AOx4. Pain rated 2/10 in lower abdomen and described as cramping- no medication needed at this time per pt.     Pt reporting nausea- medicated per MAR    Tele leads in place. Monitor room called, they stated \"sinus 81\".     Wound vac assessed- no signs of a leak, wound vac running at 125. Scant output.     SCD's on. Bed in lowest and locked position. POC discussed and all questions answered at this time.     IVF and IV abx running.     "

## 2018-04-11 ENCOUNTER — NON-PROVIDER VISIT (OUTPATIENT)
Dept: WOUND CARE | Facility: MEDICAL CENTER | Age: 53
End: 2018-04-11
Attending: SURGERY
Payer: COMMERCIAL

## 2018-04-11 LAB
BACTERIA BLD CULT: NORMAL
BACTERIA BLD CULT: NORMAL
SIGNIFICANT IND 70042: NORMAL
SIGNIFICANT IND 70042: NORMAL
SITE SITE: NORMAL
SITE SITE: NORMAL
SOURCE SOURCE: NORMAL
SOURCE SOURCE: NORMAL

## 2018-04-11 NOTE — WOUND TEAM
"[facility]  Inpatient Services  Wound & Skin Care Progress Note    Admission Date:   4/6/18  HPI, PMH, SH: Reviewed  Unit where seen by Wound Team: [id number]    WOUND CONSULT RELATED TO:  Scheduled NPWT dressing change    SUBJECTIVE:  \"I'm really hoping I go home by tonight\"    Self Report / Pain Level:  Took Tylenol prior to procedure and tolerated well with most discomfort with dressing removal and replacement.    OBJECTIVE:   Dressing intact    WOUND TYPE, LOCATION, CHARACTERISTICS (Pressure ulcers: location, stage, POA or date identified)    Wound Type/Location:  Open surgical wound RLQ    Periwound:  intact  Drainage:   minimal serosanguinous  Tissue Type and %:   100% red  Wound Edges:  attached  Odor:  none  Exposed structure(s):  none  S&S of Infection:  none  Measurements:  (taken 4/8/18)  Length:    1.0cm   Width:     12.0cm   Depth:     3.2cm   Tracts/undermining:   none      INTERVENTIONS BY WOUND TEAM:  Discussed with patient use of home VAC and how to disconnect if needed but to leave pump in place at all times.  Instructed how to fix a leak and to remove dressing if suction disrupted more than 2 hours.  Removed old dressing and irrigated wound with NS.  Benzoin and drape to hortencia wound skin and filled foam with one piece black foam secured with drape and then applied trac pad over black foam button resuming NPWT at 125mmHg continuously.  Discussed with staff RN and case management.    Interdisciplinary Collaboration: staff RN, patient,     EVALUATION: wound clean and should improve with NPWT    Factors affecting wound healing: none    Goals: decrease depth of wound by 10% in 2 weeks    NURSING PLAN OF CARE: (X)  Dressing changes: See Dressing Maintenance orders: X  Skin care: See Skin Care orders:   Rectal tube care: See Rectal Tube Care orders:   Other orders:    WOUND TEAM PLAN OF CARE (X):   NPWT change 3 x week:      X  Dressing changes by wound team:      Follow up as " needed:       Other (explain):    Anticipated discharge plans (X):  SNF:           Home Care:           Outpatient Wound Center:  X         Self Care:            Other:

## 2018-04-11 NOTE — WOUND TEAM
Patient was scheduled for a new wound evaluation today. He had his wound vac changed in the hospital yesterday less than 24 hours ago. Spoke with Tere PORRAS RN supervisor who was able to move NWE to Friday. Patient made aware of change. Did not treat patient today but did educate on changing the cannister if needed and keeping the vac charged. Patient and spouse verbalize understanding.

## 2018-04-11 NOTE — PROGRESS NOTES
Discharging Patient home per physician order.  Discharged with family.  Demonstrated understanding of discharge instructions, follow up appointments, home medications, prescriptions, home care for surgical wound, and nursing care instructions for open appendectomy, wound vac care instructions.  Ambulating without assistance, voiding without difficulty, pain well controlled, tolerating oral medications, oxygen saturation greater than 90% , tolerating diet.   Educational handouts  given and discussed.  Verbalized understanding of discharge instructions and educational handouts.  All questions answered.  Belongings with patient at time of discharge.

## 2018-04-13 ENCOUNTER — NON-PROVIDER VISIT (OUTPATIENT)
Dept: WOUND CARE | Facility: MEDICAL CENTER | Age: 53
End: 2018-04-13
Attending: SURGERY
Payer: COMMERCIAL

## 2018-04-13 PROCEDURE — 97602 WOUND(S) CARE NON-SELECTIVE: CPT

## 2018-04-13 PROCEDURE — 97605 NEG PRS WND THER DME<=50SQCM: CPT

## 2018-04-13 PROCEDURE — 99212 OFFICE O/P EST SF 10 MIN: CPT

## 2018-04-13 RX ORDER — ZOLPIDEM TARTRATE 5 MG/1
5 TABLET ORAL NIGHTLY PRN
COMMUNITY

## 2018-04-13 NOTE — CERTIFICATION
Advanced Wound Care  [facility]  [address]  [address]  [place] [address]  [phone] Fax: [phone]      Initial Evaluation  For Certification Period: 04/13/18 - 07/03/18      Referring Physician: Prashant Chase MD  Primary Physician:  Shashi Elizalde MD      Consulting Physicians:         Wound(s): RLQ abdomen  Start of Care: 04/13/18       Subjective:        HPI:  51 y/o male admitted to [facility] through ER 4/6 through 4/10/18.  He had a perforated appendicitis and underwent surgery with Dr. Chase 4/6/18.  He also developed atrial flutter, currently controlled by medications, and has a cardiology referral pending.  Wound vac was placed in hospital.                  Pain:   Pt reports pain 0/10 at rest and 4 to 5/10 with wound care.         Past Medical History:  Past Medical History:   Diagnosis Date   • Allergy    • ASTHMA    • GERD (gastroesophageal reflux disease)     no ulcer/ EGD-    • Hyperlipidemia    • Hypertension        Current Medications:    Current Outpatient Prescriptions:   •  zolpidem (AMBIEN) 5 MG Tab, Take 5 mg by mouth at bedtime as needed for Sleep., Disp: , Rfl:   •  potassium chloride SA (KDUR) 20 MEQ Tab CR, Take 1 Tab by mouth every day., Disp: 30 Tab, Rfl: 0  •  amiodarone (PACERONE) 400 MG tablet, Take 1 Tab by mouth every day., Disp: 30 Tab, Rfl: 0  •  oxyCODONE-acetaminophen (PERCOCET) 5-325 MG Tab, Take 1-2 Tabs by mouth every four hours as needed for up to 5 days. Minimum days until first refill = 5, Disp: 35 Tab, Rfl: 0  •  amoxicillin-clavulanate (AUGMENTIN) 875-125 MG Tab, Take 1 Tab by mouth 2 times a day for 3 days., Disp: 6 Tab, Rfl: 0  •  amoxicillin-clavulanate (AUGMENTIN) 875-125 MG Tab, Take 1 Tab by mouth 2 times a day., Disp: 6 Tab, Rfl: 0  •  carvedilol (COREG) 12.5 MG Tab, Take 6.25 mg by mouth 2 times a day, with meals., Disp: , Rfl:   •  valsartan (DIOVAN) 160 MG Tab, Take 160 mg by mouth every day., Disp: , Rfl:   •  aspirin EC  (ECOTRIN) 325 MG Tablet Delayed Response, Take 325 mg by mouth every day., Disp: , Rfl:   •  omeprazole (PRILOSEC) 20 MG delayed-release capsule, Take 20 mg by mouth every day., Disp: , Rfl:   •  loratadine (CLARITIN) 10 MG Tab, Take 10 mg by mouth every day., Disp: , Rfl:     Allergies:   Nkda [no known drug allergy]    Past Surgical History:   Past Surgical History:   Procedure Laterality Date   • APPENDECTOMY  4/6/2018    Procedure: OPEN APPENDECTOMY;  Surgeon: Prashant Chase M.D.;  Location: SURGERY [facility];  Service: General   • OTHER ORTHOPEDIC SURGERY      achilles       Social History:    Social History     Social History   • Marital status: Single     Spouse name: N/A   • Number of children: N/A   • Years of education: N/A     Occupational History   • Not on file.     Social History Main Topics   • Smoking status: Never Smoker   • Smokeless tobacco: Never Used   • Alcohol use Yes      Comment: occ   • Drug use: No   • Sexual activity: Not on file     Other Topics Concern   • Not on file     Social History Narrative   • No narrative on file         Objective:      Tests and Measures:   04/13/18: none today    Orthotic, protective, supportive devices:   04/13/18: none    Fall Risk Assessment (yolanda all that apply with an X): Completed upon initial eval 4/13/18: not a fall risk.              65 years or older                Fall within the last 2 years, uses   Ambulatory devices   Loss of protective sensation in feet,    Use of prostethic/orthotic, years               Presence of lower extremity/foot/toe amputation              X Taking medication that increases risk (per facility policy)       Wound Characteristics                                                    Location: RLQ abdomen   Initial Evaluation  Date: 04/13/18   Tissue Type and %: 40% adipose, 50% moist red, 10% soft purple/black(resolving hematoma?)   Periwound: intact   Drainage: Min to mod ss    Exposed structures adipose   Wound  Edges:   open   Odor: none   S&S of Infection:   none   Edema: none   Sensation: intact               Measurements: RLQ abdomen Initial Evaluation  Date: 18   Length (cm) 2   Width (cm) 11   Depth (cm) 3.7   Area (cm2) 22 cm2   Tract/undermine Tract at 3 o'clock to 2cm.       Note: please give wet to dry kit at next appt.  Forgot to give today.  Procedures:     Debridement :  Nonselective debridement using gauze/ns to remove biofilm from wound bed    Cleansed with: NS bullets x3                                                                         Periwound protected with: no sting skin prep, drape   Primary dressin piece black foam into wound bed and to accommodate trac pad (1 black total)   Secondary Dressing: drape, trac pad, vac functioning at 125mmHg continuous   Other:      Patient Education: Instructed pt on s/s infection - chills, fever, NV, increased pain/swelling/drainage or foul odor and to go to Urgent Care or ER; that battery lasts 6 hours and to always charge overnight (or plug in whenever at home); on trouble shooting, including making sure cannister is seated properly and not cracked, that clamps are open, and signs of leakage including machine alarming and screen showing that pressure is not at 125 mm/Hg or prescribed pressure; to seal leaks by painting with Skin Prep and applying more drape tape to dressing if necessary; that if unable to fix leak for 2 hours, to remove all tape and foam and cover wound with moist dressing, (gauze soaked with normal saline, then dry sterile gauze, pt given supplies) and to notify [facility] during business hours; to come to appointments 3x/week; to keep dressing D/I; to bring supplies to each appointment; on 800 number for [employer] and 24 hour support; pt verbalized understanding.     Professional Collaboration: Initial eval sent to referring provider per epic.      Assessment:      Wound etiology: open surgical wound (left open.  Did not dehisce.)    Wound  Progress:  Initial eval.  Clean wound bed.    Rationale for Treatment: NPWT to encourage rapid granulation tissue formation and manage drainage.    Patient tolerance/compliance: Pt agreeable with poc/appt schedule.    Complicating factors: none    Need for ongoing Advanced Wound Care services: continued skilled wound care for debridement as needed, dressing management and skilled clinical observation to prevent complications and expedite healing.    Plan:      Treatment Plan and Recommendations:  Diagnosis/ICD10: K35.3    Procedures/CPT: Established visit level 2, nonselective debridement 34918    Frequency: 3x/week for 30 minute appts.      Treatment Goals: STG 2 Weeks  LTG 4 Weeks   Granulation Tissue: 100% 100%   Decrease Necrotic Tissue to: 0% 0%   Wound Phase:  proliferation proliferation   Decrease Size by: 25% 50%   Periwound:  Remains intact Remains intact   Decrease tracts/undermining by: 100% n/a   Decrease Pain:  2/10 none       At the time of each visit a thorough assessment of the patient is completed to assure the  appropriateness of our plan of care.  The dressings or modalities may need to be adapted   from the original plan to address any significant changes in the wound environment.          Clinician Signature:_______________________________Date__________________      Physician Signature:______________________________Date:__________________

## 2018-04-14 NOTE — DISCHARGE SUMMARY
DATE OF ADMISSION:  04/06/2018    DATE OF DISCHARGE:  04/10/2018    DIAGNOSES ON ADMISSION:  1.  Acute appendicitis.  2.  Hypertension.    DIAGNOSES ON DISCHARGE:  1.  Acute appendicitis.  2.  Hypertension.  3.  Atrial flutter.    HISTORY AND PHYSICAL:  Please see the dictated history and physical.    HOSPITAL COURSE:  Patient was admitted to my service and taken to the   operating room floor for a planned open appendectomy.  He converted into   atrial flutter in the preop holding area.  He had a consult to cardiology   performed and was sent to the ICU postoperatively.  Please see the dictated   operative note for details of his operative procedure.  He did convert back to   sinus rhythm and was sent out of the ICU after 2 days there, he converted   back into atrial flutter and then back out.  He was followed during his   hospitalization by the cardiology service and eventually was deemed able to be   discharged home on amiodarone 400 mg p.o. daily with the plan for followup   with cardiology in 1 week.  He had a wound VAC placed.  By the date of his   discharge, he was tolerating a regular diet, he was ambulating, he was noted   to have a benign exam, and he was deemed ready for discharge home with a wound   VAC in place.    DISCHARGE RECOMMENDATIONS:  He is to follow up with me in 7-10 days.  He is to   refrain from lifting greater than 20 pounds for 4 weeks.  He may shower, but   is to not sit in a bathtub, hot tub, or go swimming until his followup   appointment.  He is to take his prescribed medications.  He is to follow up   with cardiology as set up by them.    DISCHARGE MEDICATIONS:  Amiodarone 400 mg 1 p.o. daily, oxycodone 5/325   one-to-two p.o. q. 4 hours p.r.n. pain, potassium chloride 20 mEq p.o. daily,   and Augmentin 875/125 one p.o. b.i.d. for 3 additional days.       ____________________________________     MD JOAQUÍN Hall / ARIK    DD:  04/10/2018 09:37:51  DT:  04/10/2018  09:52:21    D#:  [id number]  Job#:  [id number]

## 2018-04-16 ENCOUNTER — NON-PROVIDER VISIT (OUTPATIENT)
Dept: WOUND CARE | Facility: MEDICAL CENTER | Age: 53
End: 2018-04-16
Attending: SURGERY
Payer: COMMERCIAL

## 2018-04-16 PROCEDURE — 97605 NEG PRS WND THER DME<=50SQCM: CPT

## 2018-04-16 NOTE — DOCUMENTATION QUERY
"DOCUMENTATION QUERY    PROVIDERS: Please select “Cosign w/ note”to reply to query.    To better represent the severity of illness of your patient, please review the following information and exercise your independent professional judgment in responding to this query.     Patient was admitted for acute perforated appendicitis. \"Does meet sepsis criteria\" is documented in the ED note and \"It is likely that sepsis/appendicitis and hypokalemia contributed to triggering his atrial flutter...\" is documented per Cardiology consult.  Sepsis, however, is not documented in progress notes or Discharge Summary.     Based upon the clinical findings, risk factors, and treatment, can Sepsis be ruled in or ruled out?    Please select which applies:     • Sepsis was ruled in (specify causative organism if known and any associated acute organ dysfunction if known)  • Sepsis has been ruled out    • Other explanation of clinical findings (please document)  • Unable to determine      The medical record reflects the following:   Clinical Findings ED Note 4/6/18, Omari Hein MD    VITAL SIGNS: /77  Pulse 96  Temp 36.6 °C (97.8 °F)  Resp 18  WBC 14.2    Decision Making:    .....Additionally does have a leukocytosis, and with his tachycardia infection does meet sepsis criteria and has been given appropriate fluids as well as Rocephin for this. Has no lactic acidosis or hypotension suggestive of severe sepsis at this time        Cardiology Consult 4/6/18, Humble Navarro MD     Recommendations:  It is likely that sepsis/appendicitis and hypokalemia contributed to triggering his atrial flutter with an underlying substrate of hypertension.        Treatment Sepsis protocol, IVF, IV antibiotics   Risk Factors  Acute appendicitis with perforation, intraoperative atrial flutter   Location within  medical record  ED Note, Consult     Thank you,   [email or web address]    "

## 2018-04-16 NOTE — WOUND TEAM
Advanced Wound Care  [facility]  [address]  [address]  [place] [address]  [phone] Fax: [phone]    Encounter Note  For Certification Period: 04/13/18 - 07/03/18    Referring Physician: Prashant Chase MD  Primary Physician:  Shashi Elizalde MD      Consulting Physicians:         Wound(s): RLQ abdomen  Start of Care: 04/13/18       Subjective:        HPI:  51 y/o male admitted to [facility] through ER 4/6 through 4/10/18.  He had a perforated appendicitis and underwent surgery with Dr. Chase 4/6/18.  He also developed atrial flutter, currently controlled by medications, and has a cardiology referral pending.  Wound vac was placed in hospital.                Pain:  Pt reports mild tenderness with placing of black foam     Current Medications:    Current Outpatient Prescriptions:   •  zolpidem (AMBIEN) 5 MG Tab, Take 5 mg by mouth at bedtime as needed for Sleep., Disp: , Rfl:   •  potassium chloride SA (KDUR) 20 MEQ Tab CR, Take 1 Tab by mouth every day., Disp: 30 Tab, Rfl: 0  •  amiodarone (PACERONE) 400 MG tablet, Take 1 Tab by mouth every day., Disp: 30 Tab, Rfl: 0  •  amoxicillin-clavulanate (AUGMENTIN) 875-125 MG Tab, Take 1 Tab by mouth 2 times a day., Disp: 6 Tab, Rfl: 0  •  carvedilol (COREG) 12.5 MG Tab, Take 6.25 mg by mouth 2 times a day, with meals., Disp: , Rfl:   •  valsartan (DIOVAN) 160 MG Tab, Take 160 mg by mouth every day., Disp: , Rfl:   •  aspirin EC (ECOTRIN) 325 MG Tablet Delayed Response, Take 325 mg by mouth every day., Disp: , Rfl:   •  omeprazole (PRILOSEC) 20 MG delayed-release capsule, Take 20 mg by mouth every day., Disp: , Rfl:   •  loratadine (CLARITIN) 10 MG Tab, Take 10 mg by mouth every day., Disp: , Rfl:     Allergies:   Nkda [no known drug allergy]    Past Surgical History:   Past Surgical History:   Procedure Laterality Date   • APPENDECTOMY  4/6/2018    Procedure: OPEN APPENDECTOMY;  Surgeon: Prashant Chase M.D.;  Location: SURGERY [facility];  Service: General   • OTHER ORTHOPEDIC SURGERY      achilles      Objective:      Tests and Measures:  18: none today    Orthotic, protective, supportive devices:  18: none    Fall Risk Assessment (yolanda all that apply with an X): Completed upon initial eval 18: not a fall risk.              65 years or older                Fall within the last 2 years, uses   Ambulatory devices   Loss of protective sensation in feet,    Use of prostethic/orthotic, years               Presence of lower extremity/foot/toe amputation              X Taking medication that increases risk (per facility policy)     Wound Characteristics                                                    Location:   RLQ abdomen Initial Evaluation  Date: 18 Encounter Date:  2018   Tissue Type and %: 40% adipose, 50% moist red, 10% soft purple/black(resolving hematoma?) 90% red granulation tissue; 10% adipose   Periwound: intact Intact   Drainage: Min to mod ss  Moderate ss   Exposed structures adipose Adipose   Wound Edges:   Open Open   Odor: None None   S&S of Infection:   None None   Edema: None None   Sensation: intact Intact               Measurements:   RLQ abdomen Initial Evaluation  Date: 18   Length (cm) 2   Width (cm) 11   Depth (cm) 3.7   Area (cm2) 22 cm2   Tract/undermine Tract at 3 o'clock to 2cm.       Procedures:     Debridement: Nonselective debridement using gauze/ns to remove biofilm from wound bed    Cleansed with: NS bullets x3                                                                         Periwound protected with: No sting skin prep, drape   Primary dressin piece black foam into depth of wound bed, 1 long piece to the superior portion of wound and to bridge for button, 1 thick button to bolster over the lateral tract to accommodate trac pad (3 black total)   Secondary Dressing: drape, trac pad, vac functioning at 125mmHg continuous   Other:      Patient Education: Discussed POC  and wound progress. Wound with increased granulation tissue to wound bed today. Patient declined lidocaine today and reported only mild tenderness with black foam placement. Reinforced how to patch a leak and how to place a wet to dry dressing if needed and provided patient with supplies in case they do need it. Reinforced pt instr ss infection - erythema, edema, localized heat, fever/chills/N+V, when to call MD/go to ER. Pt with good understanding.    Professional Collaboration: None today      Assessment:      Wound etiology: open surgical wound (left open. Did not dehisce.)    Wound Progress:  Wound with increased granulation tissue; tract zulma     Rationale for Treatment: NPWT to encourage rapid granulation tissue formation and manage drainage.    Patient tolerance/compliance: Pt agreeable with poc/appt schedule.    Complicating factors: none    Need for ongoing Advanced Wound Care services: continued skilled wound care for debridement as needed, dressing management and skilled clinical observation to prevent complications and expedite healing.    Plan:      Treatment Plan and Recommendations:  Diagnosis/ICD10: K35.3    Procedures/CPT: Established visit level 2, nonselective debridement 65662    Frequency: 3x/week for 30 minute appts.    Treatment Goals: STG 2 Weeks  LTG 4 Weeks   Granulation Tissue: 100% 100%   Decrease Necrotic Tissue to: 0% 0%   Wound Phase:  proliferation proliferation   Decrease Size by: 25% 50%   Periwound:  Remains intact Remains intact   Decrease tracts/undermining by: 100% n/a   Decrease Pain:  2/10 none       At the time of each visit a thorough assessment of the patient is completed to assure the  appropriateness of our plan of care.  The dressings or modalities may need to be adapted   from the original plan to address any significant changes in the wound environment.      Clinician Signature:_______________________________Date__________________      Physician  Signature:______________________________Date:__________________

## 2018-04-17 ENCOUNTER — OFFICE VISIT (OUTPATIENT)
Dept: CARDIOLOGY | Facility: MEDICAL CENTER | Age: 53
End: 2018-04-17
Payer: COMMERCIAL

## 2018-04-17 VITALS
WEIGHT: 232 LBS | OXYGEN SATURATION: 98 % | HEART RATE: 96 BPM | SYSTOLIC BLOOD PRESSURE: 110 MMHG | BODY MASS INDEX: 29.77 KG/M2 | HEIGHT: 74 IN | DIASTOLIC BLOOD PRESSURE: 70 MMHG

## 2018-04-17 DIAGNOSIS — I48.3 TYPICAL ATRIAL FLUTTER (HCC): ICD-10-CM

## 2018-04-17 DIAGNOSIS — I10 ESSENTIAL HYPERTENSION: ICD-10-CM

## 2018-04-17 DIAGNOSIS — E78.5 DYSLIPIDEMIA: ICD-10-CM

## 2018-04-17 PROCEDURE — 99214 OFFICE O/P EST MOD 30 MIN: CPT | Performed by: INTERNAL MEDICINE

## 2018-04-17 RX ORDER — AMIODARONE HYDROCHLORIDE 200 MG/1
200 TABLET ORAL DAILY
Qty: 30 TAB | Refills: 3 | Status: SHIPPED | OUTPATIENT
Start: 2018-04-17 | End: 2018-12-05

## 2018-04-17 ASSESSMENT — ENCOUNTER SYMPTOMS
BRUISES/BLEEDS EASILY: 0
MYALGIAS: 0
NAUSEA: 0
SPEECH CHANGE: 0
PALPITATIONS: 0
WHEEZING: 0
LOSS OF CONSCIOUSNESS: 0
COUGH: 0
EYE PAIN: 0
NERVOUS/ANXIOUS: 0
FEVER: 0
CHILLS: 0
HEMOPTYSIS: 0
ABDOMINAL PAIN: 1
EYE DISCHARGE: 0
BLURRED VISION: 0
VOMITING: 0
DEPRESSION: 0

## 2018-04-17 NOTE — LETTER
[facility]   [address]  [place] [address]  Phone: [phone]  Fax: [phone]              Jesse Norwood  1965    Encounter Date: 4/17/2018    Tina Nunez M.D.          PROGRESS NOTE:  No notes on file      No Recipients

## 2018-04-17 NOTE — PROGRESS NOTES
Chief Complaint   Patient presents with   • Atrial Flutter     hospital follow up   HTN    Subjective:   Jesse Norwood is a 52 y.o. male who presents today for f/u above issues    Admitted 11 days ago with ruptured appendix  We were consulted for atrial flutter  Converted to sinus rhythm with IV amiodarone  No indication for recurrence so far  On 400 mg/d  Also started on carvedilol. Usually take valsartan for HTN  Taking both. BP mostly 110-130  HR up above 100+ at time at home  Has device to monitor his own heart rhythm. All showed sinus rhythm  Concerned about side effects of amiodarone  No known CAD  ECHO during admission showed NL EF without any stuctural heart issue  Having CMP and other labs done for PCP    Past Medical History:   Diagnosis Date   • Allergy    • ASTHMA    • GERD (gastroesophageal reflux disease)     no ulcer/ EGD-    • Hyperlipidemia    • Hypertension      Past Surgical History:   Procedure Laterality Date   • APPENDECTOMY  4/6/2018    Procedure: OPEN APPENDECTOMY;  Surgeon: Prashant Chase M.D.;  Location: SURGERY [facility];  Service: General   • OTHER ORTHOPEDIC SURGERY      [place]     No family history on file.  Social History     Social History   • Marital status: Single     Spouse name: N/A   • Number of children: N/A   • Years of education: N/A     Occupational History   • Not on file.     Social History Main Topics   • Smoking status: Never Smoker   • Smokeless tobacco: Never Used   • Alcohol use Yes      Comment: occ   • Drug use: No   • Sexual activity: Not on file     Other Topics Concern   • Not on file     Social History Narrative   • No narrative on file     Allergies   Allergen Reactions   • Nkda [No Known Drug Allergy]      Outpatient Encounter Prescriptions as of 4/17/2018   Medication Sig Dispense Refill   • amiodarone (CORDARONE) 200 MG Tab Take 1 Tab by mouth every day. 30 Tab 3   • potassium chloride SA (KDUR) 20 MEQ Tab CR Take 1 Tab by mouth every day. 30 Tab  "0   • amoxicillin-clavulanate (AUGMENTIN) 875-125 MG Tab Take 1 Tab by mouth 2 times a day. 6 Tab 0   • carvedilol (COREG) 12.5 MG Tab Take 6.25 mg by mouth 2 times a day, with meals.     • valsartan (DIOVAN) 160 MG Tab Take 160 mg by mouth every day.     • aspirin EC (ECOTRIN) 325 MG Tablet Delayed Response Take 325 mg by mouth every day.     • omeprazole (PRILOSEC) 20 MG delayed-release capsule Take 20 mg by mouth every day.     • loratadine (CLARITIN) 10 MG Tab Take 10 mg by mouth every day.     • zolpidem (AMBIEN) 5 MG Tab Take 5 mg by mouth at bedtime as needed for Sleep.     • [DISCONTINUED] amiodarone (PACERONE) 400 MG tablet Take 1 Tab by mouth every day. 30 Tab 0     No facility-administered encounter medications on file as of 4/17/2018.      Review of Systems   Constitutional: Negative for chills and fever.   HENT: Negative for congestion.    Eyes: Negative for blurred vision, pain and discharge.   Respiratory: Negative for cough, hemoptysis and wheezing.    Cardiovascular: Negative for chest pain and palpitations.   Gastrointestinal: Positive for abdominal pain. Negative for nausea and vomiting.        Around the drain site RLQ   Musculoskeletal: Negative for joint pain and myalgias.   Neurological: Negative for speech change and loss of consciousness.   Endo/Heme/Allergies: Does not bruise/bleed easily.   Psychiatric/Behavioral: Negative for depression. The patient is not nervous/anxious.    All other systems reviewed and are negative.       Objective:   /70   Pulse 96   Ht 1.88 m (6' 2\")   Wt 105.2 kg (232 lb)   SpO2 98%   BMI 29.79 kg/m²     Physical Exam   Constitutional: He is oriented to person, place, and time.   Neck: No JVD present.   Cardiovascular: Normal rate and regular rhythm.  Exam reveals distant heart sounds. Exam reveals no gallop.    No murmur heard.  Pulmonary/Chest: Effort normal and breath sounds normal. No respiratory distress. He has no wheezes. He has no rales. "   Abdominal: Soft. He exhibits no distension. There is no tenderness.   Wound vac RLQ   Musculoskeletal: He exhibits no edema.   Neurological: He is alert and oriented to person, place, and time.   Skin: Skin is warm and dry. No erythema.   Psychiatric: He has a normal mood and affect.       Assessment:     1. Typical atrial flutter (CMS-HCC) no recurrence on amiodarone. Could be precipitated by acute stress for peritonitis    2. Dyslipidemia Low HDL     3. Essential hypertension         Medical Decision Making:  Today's Assessment / Status / Plan:     Will try cut amiodarone down to 200 mg/d by the end of the week.  If no recurrence atrial flutter, will try taper it off in the near future (6-8 weeks).  If it recurs, will try flecainide of propafenone.  BP has been in decent range. With weight loss, may need less medications.  He is to continue BP and HR monitor.  Will see him back in 6 weeks or sooner as needed.  Could try carvedilol without valsartan in the future.  We will keep you posted about our findings and further recommendations as they become available. Please also do not hesitate to call for any questions.  Thank you kindly for allowing me to participate in the care of this patient..

## 2018-04-18 ENCOUNTER — NON-PROVIDER VISIT (OUTPATIENT)
Dept: WOUND CARE | Facility: MEDICAL CENTER | Age: 53
End: 2018-04-18
Attending: SURGERY
Payer: COMMERCIAL

## 2018-04-18 PROCEDURE — 97605 NEG PRS WND THER DME<=50SQCM: CPT

## 2018-04-18 NOTE — WOUND TEAM
Advanced Wound Care  [facility]  [address]  [address]  [place] [address]  [phone] Fax: [phone]    Encounter Note  For Certification Period: 04/13/18 - 07/03/18    Referring Physician: Prashant Chase MD  Primary Physician:  Shashi Elizalde MD      Consulting Physicians:         Wound(s): RLQ abdomen  Start of Care: 04/13/18       Subjective:        HPI:  51 y/o male admitted to [facility] through ER 4/6 through 4/10/18.  He had a perforated appendicitis and underwent surgery with Dr. Chase 4/6/18.  He also developed atrial flutter, currently controlled by medications, and has a cardiology referral pending.  Wound vac was placed in hospital.                  Pain:  Pt reports mild tenderness with placing of black foam     Current Medications:    Current Outpatient Prescriptions:   •  amiodarone (CORDARONE) 200 MG Tab, Take 1 Tab by mouth every day., Disp: 30 Tab, Rfl: 3  •  zolpidem (AMBIEN) 5 MG Tab, Take 5 mg by mouth at bedtime as needed for Sleep., Disp: , Rfl:   •  potassium chloride SA (KDUR) 20 MEQ Tab CR, Take 1 Tab by mouth every day., Disp: 30 Tab, Rfl: 0  •  amoxicillin-clavulanate (AUGMENTIN) 875-125 MG Tab, Take 1 Tab by mouth 2 times a day., Disp: 6 Tab, Rfl: 0  •  carvedilol (COREG) 12.5 MG Tab, Take 6.25 mg by mouth 2 times a day, with meals., Disp: , Rfl:   •  valsartan (DIOVAN) 160 MG Tab, Take 160 mg by mouth every day., Disp: , Rfl:   •  aspirin EC (ECOTRIN) 325 MG Tablet Delayed Response, Take 325 mg by mouth every day., Disp: , Rfl:   •  omeprazole (PRILOSEC) 20 MG delayed-release capsule, Take 20 mg by mouth every day., Disp: , Rfl:   •  loratadine (CLARITIN) 10 MG Tab, Take 10 mg by mouth every day., Disp: , Rfl:     Allergies:   Nkda [no known drug allergy]    Past Surgical History:   Past Surgical History:   Procedure Laterality Date   • APPENDECTOMY  4/6/2018    Procedure: OPEN APPENDECTOMY;  Surgeon: Prashant Chase M.D.;  Location: SURGERY [facility]  [facility];  Service: General   • OTHER ORTHOPEDIC SURGERY      [place]      Objective:      Tests and Measures:  18: none today    Orthotic, protective, supportive devices:  18: none    Fall Risk Assessment (yolanda all that apply with an X): Completed upon initial eval 18: not a fall risk.              65 years or older                Fall within the last 2 years, uses   Ambulatory devices   Loss of protective sensation in feet,    Use of prostethic/orthotic, years               Presence of lower extremity/foot/toe amputation              X Taking medication that increases risk (per facility policy)     Wound Characteristics                                                    Location:   RLQ abdomen Initial Evaluation  Date: 18 Encounter Date:  2018   Tissue Type and %: 40% adipose, 50% moist red, 10% soft purple/black(resolving hematoma?) 95% red granulation tissue; 5% adipose   Periwound: intact Intact   Drainage: Min to mod ss  Moderate ss (cannister changed )   Exposed structures adipose Adipose   Wound Edges:   Open Open   Odor: None None   S&S of Infection:   None None   Edema: None None   Sensation: intact Intact               Measurements:   RLQ abdomen Initial Evaluation  Date: 18   Length (cm) 2   Width (cm) 11   Depth (cm) 3.7   Area (cm2) 22 cm2   Tract/undermine Tract at 3 o'clock to 2cm.       Procedures:  Clinician removed vac - inspected wound, no remaining foam in wound bed   Debridement: Nonselective debridement using gauze/ns and cotton tipped applicator to remove biofilm from wound bed    Cleansed with: NS bullets x3                                                                         Periwound protected with: No sting skin prep, drape   Primary dressin piece black foam into depth of wound bed, 1 button over the lateral edge of wound bed (per patient request and comfort) to accommodate trac pad (2 black total)   Secondary Dressing: drape, vac  functioning at 125mmHg continuous with no leaks noted.    Other:      Patient Education: Discussed POC and wound progress. Wound with increased granulation tissue to wound bed today. Wound with less pain also with vac removal and insertion of foam. Reinforced how to patch a leak and how to place a wet to dry dressing if needed and provided patient with supplies in case they do need it. Reinforced pt instr ss infection - erythema, edema, localized heat, fever/chills/N+V, when to call MD/go to ER. Pt with good understanding.    Professional Collaboration: None today      Assessment:      Wound etiology: open surgical wound (left open. Did not dehisce.)    Wound Progress:  Wound with increased granulation tissue; tract closed today    Rationale for Treatment: NPWT to encourage rapid granulation tissue formation and manage drainage.    Patient tolerance/compliance: Pt agreeable with poc/appt schedule.    Complicating factors: none    Need for ongoing Advanced Wound Care services: continued skilled wound care for debridement as needed, dressing management and skilled clinical observation to prevent complications and expedite healing.    Plan:      Treatment Plan and Recommendations:  Diagnosis/ICD10: K35.3    Procedures/CPT: Established visit level 2, nonselective debridement 92510    Frequency: 3x/week for 30 minute appts.    Treatment Goals: STG 2 Weeks  LTG 4 Weeks   Granulation Tissue: 100% 100%   Decrease Necrotic Tissue to: 0% 0%   Wound Phase:  proliferation proliferation   Decrease Size by: 25% 50%   Periwound:  Remains intact Remains intact   Decrease tracts/undermining by: 100% n/a   Decrease Pain:  2/10 none       At the time of each visit a thorough assessment of the patient is completed to assure the  appropriateness of our plan of care.  The dressings or modalities may need to be adapted   from the original plan to address any significant changes in the wound environment.      Clinician  Signature:_______________________________Date__________________      Physician Signature:______________________________Date:__________________

## 2018-04-20 ENCOUNTER — NON-PROVIDER VISIT (OUTPATIENT)
Dept: WOUND CARE | Facility: MEDICAL CENTER | Age: 53
End: 2018-04-20
Attending: SURGERY
Payer: COMMERCIAL

## 2018-04-20 ENCOUNTER — HOSPITAL ENCOUNTER (OUTPATIENT)
Dept: RADIOLOGY | Facility: MEDICAL CENTER | Age: 53
End: 2018-04-20

## 2018-04-20 PROCEDURE — 97605 NEG PRS WND THER DME<=50SQCM: CPT

## 2018-04-20 NOTE — WOUND TEAM
Advanced Wound Care  [facility]  [address]  [address]  [place] [address]  [phone] Fax: [phone]    Encounter Note  For Certification Period: 04/13/18 - 07/03/18    Referring Physician: Prashant Chase MD  Primary Physician:  Shashi Elizalde MD      Consulting Physicians:         Wound(s): RLQ abdomen  Start of Care: 04/13/18       Subjective:        HPI:  53 y/o male admitted to [facility] through ER 4/6 through 4/10/18.  He had a perforated appendicitis and underwent surgery with Dr. Chase 4/6/18.  He also developed atrial flutter, currently controlled by medications, and has a cardiology referral pending.  Wound vac was placed in hospital.                  Pain:  Pt reports mild tenderness with placing of black foam     Current Medications:    Current Outpatient Prescriptions:   •  amiodarone (CORDARONE) 200 MG Tab, Take 1 Tab by mouth every day., Disp: 30 Tab, Rfl: 3  •  zolpidem (AMBIEN) 5 MG Tab, Take 5 mg by mouth at bedtime as needed for Sleep., Disp: , Rfl:   •  potassium chloride SA (KDUR) 20 MEQ Tab CR, Take 1 Tab by mouth every day., Disp: 30 Tab, Rfl: 0  •  amoxicillin-clavulanate (AUGMENTIN) 875-125 MG Tab, Take 1 Tab by mouth 2 times a day., Disp: 6 Tab, Rfl: 0  •  carvedilol (COREG) 12.5 MG Tab, Take 6.25 mg by mouth 2 times a day, with meals., Disp: , Rfl:   •  valsartan (DIOVAN) 160 MG Tab, Take 160 mg by mouth every day., Disp: , Rfl:   •  aspirin EC (ECOTRIN) 325 MG Tablet Delayed Response, Take 325 mg by mouth every day., Disp: , Rfl:   •  omeprazole (PRILOSEC) 20 MG delayed-release capsule, Take 20 mg by mouth every day., Disp: , Rfl:   •  loratadine (CLARITIN) 10 MG Tab, Take 10 mg by mouth every day., Disp: , Rfl:     Allergies:   Nkda [no known drug allergy]    Past Surgical History:   Past Surgical History:   Procedure Laterality Date   • APPENDECTOMY  4/6/2018    Procedure: OPEN APPENDECTOMY;  Surgeon: Prashant Chase M.D.;  Location: SURGERY [facility];  Service: General   • OTHER ORTHOPEDIC SURGERY      achilles      Objective:      Tests and Measures:  04/13/18: none today    Orthotic, protective, supportive devices:  04/13/18: none    Fall Risk Assessment (yolanda all that apply with an X): Completed upon initial eval 4/13/18: not a fall risk.              65 years or older                Fall within the last 2 years, uses   Ambulatory devices   Loss of protective sensation in feet,    Use of prostethic/orthotic, years               Presence of lower extremity/foot/toe amputation              X Taking medication that increases risk (per facility policy)     Wound Characteristics                                                    Location:   RLQ abdomen Initial Evaluation  Date: 04/13/18 Encounter Date:  04/20/2018   Tissue Type and %: 40% adipose, 50% moist red, 10% soft purple/black(resolving hematoma?) 100% red granulation tissue   Periwound: intact Intact   Drainage: Min to mod ss  Moderate ss (cannister changed 4/18)   Exposed structures adipose None   Wound Edges:   Open Open   Odor: None None   S&S of Infection:   None None   Edema: None None   Sensation: intact Intact               Measurements:   RLQ abdomen Initial Evaluation  Date: 04/13/18 Encounter Date: 04/20/2018   Length (cm) 2 1.2   Width (cm) 11 10.8   Depth (cm) 3.7 1.5   Area (cm2) 22 cm2 12.96 cm2   Tract/undermine Tract at 3 o'clock to 2cm. None             Procedures:  Clinician removed vac - inspected wound, no remaining foam in wound bed   Debridement: Nonselective debridement using gauze/ns and cotton tipped applicator to remove biofilm from wound bed    Cleansed with: NS bullets x2                                                                         Periwound protected with: No sting skin prep, drape   Primary dressing: AqAg over blister at later end of wound. 1 piece black foam into depth of wound bed, 1 button over the centerof wound bed (to avoid blister at lateral end of  wound) to accommodate trac pad (2 black total)   Secondary Dressing: drape, vac functioning at 125mmHg continuous with no leaks noted.    Other:      Patient Education: POC and wound progress discussed with pt. Blister formed at the lateral end of the wound. Discussed rationale for AqAg, pt agreeable. Pt able to verbalizes how to patch leak and what to do if he is unable to do so in 2 hours. Reviewed s/s of infection, and when to go to ER. Pt verbalizes understanding to all.    Professional Collaboration: None today      Assessment:      Wound etiology: open surgical wound (left open. Did not dehisce.)    Wound Progress:  Wound with increased granulation tissue; tract closed; smaller per measurement    Rationale for Treatment: NPWT to encourage rapid granulation tissue formation and manage drainage.    Patient tolerance/compliance: Pt agreeable with poc/appt schedule.    Complicating factors: none    Need for ongoing Advanced Wound Care services: continued skilled wound care for debridement as needed, dressing management and skilled clinical observation to prevent complications and expedite healing.    Plan:      Treatment Plan and Recommendations:  Diagnosis/ICD10: K35.3    Procedures/CPT: Established visit level 2, nonselective debridement 07172, NPWT 09101    Frequency: 3x/week for 30 minute appts.    Treatment Goals: STG 2 Weeks  LTG 4 Weeks   Granulation Tissue: 100% 100%   Decrease Necrotic Tissue to: 0% 0%   Wound Phase:  proliferation proliferation   Decrease Size by: 25% 50%   Periwound:  Remains intact Remains intact   Decrease tracts/undermining by: 100% n/a   Decrease Pain:  2/10 none       At the time of each visit a thorough assessment of the patient is completed to assure the  appropriateness of our plan of care.  The dressings or modalities may need to be adapted   from the original plan to address any significant changes in the wound environment.      Clinician  Signature:_______________________________Date__________________      Physician Signature:______________________________Date:__________________

## 2018-04-23 ENCOUNTER — NON-PROVIDER VISIT (OUTPATIENT)
Dept: WOUND CARE | Facility: MEDICAL CENTER | Age: 53
End: 2018-04-23
Attending: SURGERY
Payer: COMMERCIAL

## 2018-04-23 PROCEDURE — 97605 NEG PRS WND THER DME<=50SQCM: CPT

## 2018-04-23 NOTE — WOUND TEAM
Advanced Wound Care  [facility]  [address]  [address]  [place] [address]  [phone] Fax: [phone]    Encounter Note  For Certification Period: 04/13/18 - 07/03/18    Referring Physician: Prashant Chase MD  Primary Physician:  Shashi Elizalde MD      Consulting Physicians:         Wound(s): RLQ abdomen  Start of Care: 04/13/18       Subjective:        HPI:  51 y/o male admitted to [facility] through ER 4/6 through 4/10/18.  He had a perforated appendicitis and underwent surgery with Dr. Chase 4/6/18.  He also developed atrial flutter, currently controlled by medications, and has a cardiology referral pending.  Wound vac was placed in hospital.                  Pain:  Pt reports mild tenderness with placing of black foam     Current Medications:    Current Outpatient Prescriptions:   •  amiodarone (CORDARONE) 200 MG Tab, Take 1 Tab by mouth every day., Disp: 30 Tab, Rfl: 3  •  zolpidem (AMBIEN) 5 MG Tab, Take 5 mg by mouth at bedtime as needed for Sleep., Disp: , Rfl:   •  potassium chloride SA (KDUR) 20 MEQ Tab CR, Take 1 Tab by mouth every day., Disp: 30 Tab, Rfl: 0  •  amoxicillin-clavulanate (AUGMENTIN) 875-125 MG Tab, Take 1 Tab by mouth 2 times a day., Disp: 6 Tab, Rfl: 0  •  carvedilol (COREG) 12.5 MG Tab, Take 6.25 mg by mouth 2 times a day, with meals., Disp: , Rfl:   •  valsartan (DIOVAN) 160 MG Tab, Take 160 mg by mouth every day., Disp: , Rfl:   •  aspirin EC (ECOTRIN) 325 MG Tablet Delayed Response, Take 325 mg by mouth every day., Disp: , Rfl:   •  omeprazole (PRILOSEC) 20 MG delayed-release capsule, Take 20 mg by mouth every day., Disp: , Rfl:   •  loratadine (CLARITIN) 10 MG Tab, Take 10 mg by mouth every day., Disp: , Rfl:     Allergies:   Nkda [no known drug allergy]    Past Surgical History:   Past Surgical History:   Procedure Laterality Date   • APPENDECTOMY  4/6/2018    Procedure: OPEN APPENDECTOMY;  Surgeon: Prashant Chase M.D.;  Location: SURGERY [facility]  [facility];  Service: General   • OTHER ORTHOPEDIC SURGERY      [place]illes      Objective:      Tests and Measures:  04/13/18: none today    Orthotic, protective, supportive devices:  04/13/18: none    Fall Risk Assessment (yolanda all that apply with an X): Completed upon initial eval 4/13/18: not a fall risk.              65 years or older                Fall within the last 2 years, uses   Ambulatory devices   Loss of protective sensation in feet,    Use of prostethic/orthotic, years               Presence of lower extremity/foot/toe amputation              X Taking medication that increases risk (per facility policy)     Wound Characteristics                                                    Location:   RLQ abdomen Initial Evaluation  Date: 04/13/18 Encounter Date:  04/23/2018   Tissue Type and %: 40% adipose, 50% moist red, 10% soft purple/black(resolving hematoma?) 100% red granulation tissue   Periwound: intact Intact   Drainage: Min to mod ss  Moderate ss (cannister changed 4/18)   Exposed structures adipose None   Wound Edges:   Open Open   Odor: None None   S&S of Infection:   None None   Edema: None None   Sensation: intact Intact               Measurements:   RLQ abdomen Initial Evaluation  Date: 04/13/18 Encounter Date: 04/20/2018   Length (cm) 2 1.2   Width (cm) 11 10.8   Depth (cm) 3.7 1.5   Area (cm2) 22 cm2 12.96 cm2   Tract/undermine Tract at 3 o'clock to 2cm. None             Procedures:  Clinician removed vac - inspected wound, no remaining foam in wound bed   Debridement: Nonselective debridement using gauze/ns and cotton tipped applicator to remove biofilm from wound bed    Cleansed with: NS bullets x2                                                                         Periwound protected with: No sting skin prep, drape   Primary dressing: AqAg over nearly healed blister at later end of wound. 1 piece black foam into depth of wound bed, 1 button over the centerof wound bed (to avoid blister at  lateral end of wound) to accommodate trac pad (2 black total)   Secondary Dressing: drape, vac functioning at 125mmHg continuous with no leaks noted.    Other:      Patient Education: POC and wound progress discussed with pt. Blister at the lateral end of the wound nearly healed, covered with AqAg. Pt able to verbalizes how to patch leak and what to do if he is unable to do so in 2 hours. Reviewed s/s of infection, and when to go to ER. Pt verbalizes understanding to all.    Professional Collaboration: None today      Assessment:      Wound etiology: open surgical wound (left open. Did not dehisce.)    Wound Progress:  No significant change.    Rationale for Treatment: NPWT to encourage rapid granulation tissue formation and manage drainage.    Patient tolerance/compliance: Pt agreeable with poc/appt schedule.    Complicating factors: none    Need for ongoing Advanced Wound Care services: continued skilled wound care for debridement as needed, dressing management and skilled clinical observation to prevent complications and expedite healing.    Plan:      Treatment Plan and Recommendations:  Diagnosis/ICD10: K35.3    Procedures/CPT: Established visit level 2, nonselective debridement 40209, NPWT 79439    Frequency: 3x/week for 30 minute appts.    Treatment Goals: STG 2 Weeks  LTG 4 Weeks   Granulation Tissue: 100% 100%   Decrease Necrotic Tissue to: 0% 0%   Wound Phase:  proliferation proliferation   Decrease Size by: 25% 50%   Periwound:  Remains intact Remains intact   Decrease tracts/undermining by: 100% n/a   Decrease Pain:  2/10 none       At the time of each visit a thorough assessment of the patient is completed to assure the  appropriateness of our plan of care.  The dressings or modalities may need to be adapted   from the original plan to address any significant changes in the wound environment.      Clinician Signature:_______________________________Date__________________      Physician  Signature:______________________________Date:__________________

## 2018-04-25 ENCOUNTER — NON-PROVIDER VISIT (OUTPATIENT)
Dept: WOUND CARE | Facility: MEDICAL CENTER | Age: 53
End: 2018-04-25
Attending: SURGERY
Payer: COMMERCIAL

## 2018-04-25 PROCEDURE — 97605 NEG PRS WND THER DME<=50SQCM: CPT

## 2018-04-25 NOTE — WOUND TEAM
Advanced Wound Care  [facility]  [address]  [address]  [place] [address]  [phone] Fax: [phone]    Encounter Note  For Certification Period: 04/13/18 - 07/03/18    Referring Physician: Prashant Chase MD  Primary Physician:  Shashi Elizalde MD      Consulting Physicians:         Wound(s): RLQ abdomen  Start of Care: 04/13/18       Subjective:        HPI:  51 y/o male admitted to [facility] through ER 4/6 through 4/10/18.  He had a perforated appendicitis and underwent surgery with Dr. Chase 4/6/18.  He also developed atrial flutter, currently controlled by medications, and has a cardiology referral pending.  Wound vac was placed in hospital.                  Pain:  Denies any    Current Medications:    Current Outpatient Prescriptions:   •  amiodarone (CORDARONE) 200 MG Tab, Take 1 Tab by mouth every day., Disp: 30 Tab, Rfl: 3  •  zolpidem (AMBIEN) 5 MG Tab, Take 5 mg by mouth at bedtime as needed for Sleep., Disp: , Rfl:   •  potassium chloride SA (KDUR) 20 MEQ Tab CR, Take 1 Tab by mouth every day., Disp: 30 Tab, Rfl: 0  •  amoxicillin-clavulanate (AUGMENTIN) 875-125 MG Tab, Take 1 Tab by mouth 2 times a day., Disp: 6 Tab, Rfl: 0  •  carvedilol (COREG) 12.5 MG Tab, Take 6.25 mg by mouth 2 times a day, with meals., Disp: , Rfl:   •  valsartan (DIOVAN) 160 MG Tab, Take 160 mg by mouth every day., Disp: , Rfl:   •  aspirin EC (ECOTRIN) 325 MG Tablet Delayed Response, Take 325 mg by mouth every day., Disp: , Rfl:   •  omeprazole (PRILOSEC) 20 MG delayed-release capsule, Take 20 mg by mouth every day., Disp: , Rfl:   •  loratadine (CLARITIN) 10 MG Tab, Take 10 mg by mouth every day., Disp: , Rfl:     Allergies:   Nkda [no known drug allergy]    Past Surgical History:   Past Surgical History:   Procedure Laterality Date   • APPENDECTOMY  4/6/2018    Procedure: OPEN APPENDECTOMY;  Surgeon: Prashant Chase M.D.;  Location: SURGERY [facility];  Service: General   • OTHER  ORTHOPEDIC SURGERY      [facility]illes      Objective:      Tests and Measures:  18: none today    Orthotic, protective, supportive devices:  18: none    Fall Risk Assessment (yolanda all that apply with an X): Completed upon initial eval 18: not a fall risk.              65 years or older                Fall within the last 2 years, uses   Ambulatory devices   Loss of protective sensation in feet,    Use of prostethic/orthotic, years               Presence of lower extremity/foot/toe amputation              X Taking medication that increases risk (per facility policy)     Wound Characteristics                                                    Location:   RLQ abdomen Initial Evaluation  Date: 18 Encounter Date:  2018   Tissue Type and %: 40% adipose, 50% moist red, 10% soft purple/black(resolving hematoma?) 100% red granulation tissue   Periwound: intact Intact   Drainage: Min to mod ss  moderate ss   Exposed structures adipose None   Wound Edges:   Open Open   Odor: None None   S&S of Infection:   None None   Edema: None None   Sensation: intact Intact               Measurements:   RLQ abdomen Initial Evaluation  Date: 18 Encounter Date: 2018   Length (cm) 2 1.2   Width (cm) 11 10.8   Depth (cm) 3.7 1.5   Area (cm2) 22 cm2 12.96 cm2   Tract/undermine Tract at 3 o'clock to 2cm. None             Procedures: removed vac - inspected wound, no remaining foam in wound bed   Debridement: Nonselective debridement using gauze/ns and cotton tipped applicator    Cleansed with: NS bullets x2                                                                         Periwound protected with: trimmed hair & washed with water, skin prep, drape   Primary dressin piece black foam into depth of wound bed, 1 button over the center of wound bed (to avoid blister at lateral end of wound) to accommodate trac pad (2 black total)   Secondary Dressing: drape, vac functioning at 125mmHg continuous with no  leaks noted.    Other: cannister changed by patient during appoitment     Patient Education: POC and wound progress discussed with pt. Blister at the lateral end of the wound resolved. Pt able to verbalizes how to patch leak and what to do if he is unable to do so in 2 hours. Reviewed s/s of infection, and when to go to ER. Discussed importance of nutrition & hydration for wound healing, as well as proper rest as body needs; handouts provided. Pt verbalizes understanding to all.    Professional Collaboration: None today      Assessment:      Wound etiology: open surgical wound (left open. Did not dehisce.)    Wound Progress: decreasing size, zulma    Rationale for Treatment: NPWT to encourage rapid granulation tissue formation and manage drainage.    Patient tolerance/compliance: Pt agreeable with poc/appt schedule.    Complicating factors: infection    Need for ongoing Advanced Wound Care services: continued skilled wound care for debridement as needed, dressing management and skilled clinical observation to prevent complications and expedite healing.    Plan:      Treatment Plan and Recommendations:  Diagnosis/ICD10: K35.3    Procedures/CPT: Established visit level 2, nonselective debridement 24335, NPWT 59307    Frequency: 3x/week for 30 minute appts.    Treatment Goals: STG 2 Weeks  LTG 4 Weeks   Granulation Tissue: 100% 100%   Decrease Necrotic Tissue to: 0% 0%   Wound Phase:  proliferation proliferation   Decrease Size by: 25% 50%   Periwound:  Remains intact Remains intact   Decrease tracts/undermining by: 100% n/a   Decrease Pain:  2/10 none       At the time of each visit a thorough assessment of the patient is completed to assure the  appropriateness of our plan of care.  The dressings or modalities may need to be adapted   from the original plan to address any significant changes in the wound environment.      Clinician Signature:_______________________________Date__________________      Physician  Signature:______________________________Date:__________________

## 2018-04-27 ENCOUNTER — NON-PROVIDER VISIT (OUTPATIENT)
Dept: WOUND CARE | Facility: MEDICAL CENTER | Age: 53
End: 2018-04-27
Attending: SURGERY
Payer: COMMERCIAL

## 2018-04-27 PROCEDURE — 97605 NEG PRS WND THER DME<=50SQCM: CPT

## 2018-04-27 NOTE — WOUND TEAM
Advanced Wound Care  [facility]  [address]  [address]  [place] [address]  [phone] Fax: [phone]    Encounter Note  For Certification Period: 04/13/18 - 07/03/18    Referring Physician: Prashant Chase MD  Primary Physician:  Shashi Elizalde MD      Consulting Physicians:         Wound(s): RLQ abdomen  Start of Care: 04/13/18       Subjective:        HPI:  53 y/o male admitted to [facility] through ER 4/6 through 4/10/18.  He had a perforated appendicitis and underwent surgery with Dr. Chase 4/6/18.  He also developed atrial flutter, currently controlled by medications, and has a cardiology referral pending.  Wound vac was placed in hospital.                  Pain:  Denies any    Current Medications:    Current Outpatient Prescriptions:   •  amiodarone (CORDARONE) 200 MG Tab, Take 1 Tab by mouth every day., Disp: 30 Tab, Rfl: 3  •  zolpidem (AMBIEN) 5 MG Tab, Take 5 mg by mouth at bedtime as needed for Sleep., Disp: , Rfl:   •  potassium chloride SA (KDUR) 20 MEQ Tab CR, Take 1 Tab by mouth every day., Disp: 30 Tab, Rfl: 0  •  amoxicillin-clavulanate (AUGMENTIN) 875-125 MG Tab, Take 1 Tab by mouth 2 times a day., Disp: 6 Tab, Rfl: 0  •  carvedilol (COREG) 12.5 MG Tab, Take 6.25 mg by mouth 2 times a day, with meals., Disp: , Rfl:   •  valsartan (DIOVAN) 160 MG Tab, Take 160 mg by mouth every day., Disp: , Rfl:   •  aspirin EC (ECOTRIN) 325 MG Tablet Delayed Response, Take 325 mg by mouth every day., Disp: , Rfl:   •  omeprazole (PRILOSEC) 20 MG delayed-release capsule, Take 20 mg by mouth every day., Disp: , Rfl:   •  loratadine (CLARITIN) 10 MG Tab, Take 10 mg by mouth every day., Disp: , Rfl:     Allergies:   Nkda [no known drug allergy]       Objective:      Tests and Measures:  04/13/18: none today    Orthotic, protective, supportive devices:  04/13/18: none    Fall Risk Assessment (yolanda all that apply with an X): Completed upon initial eval 4/13/18: not a fall  risk.              65 years or older                Fall within the last 2 years, uses   Ambulatory devices   Loss of protective sensation in feet,    Use of prostethic/orthotic, years               Presence of lower extremity/foot/toe amputation              X Taking medication that increases risk (per facility policy)     Wound Characteristics                                                    Location:   RLQ abdomen Initial Evaluation  Date: 18 Encounter Date:  2018   Tissue Type and %: 40% adipose, 50% moist red, 10% soft purple/black(resolving hematoma?) 100% red granulation tissue   Periwound: intact Intact   Drainage: Min to mod ss  moderate ss   Exposed structures adipose None   Wound Edges:   Open Open   Odor: None None   S&S of Infection:   None None   Edema: None None   Sensation: intact Intact               Measurements:   RLQ abdomen Initial Evaluation  Date: 18 Encounter Date: 2018   Length (cm) 2 0.8   Width (cm) 11 8.2   Depth (cm) 3.7 0.7   Area (cm2) 22 cm2 6.56 cm2   Tract/undermine Tract at 3 o'clock to 2cm. None             Procedures: Foam removed by tech - wound bed inspected by this clinician, no remaining foam in wound bed   Debridement: Nonselective debridement using gauze/ns and cotton tipped applicator    Cleansed with: NS                                                                        Periwound protected with: No sting skin prep, drape   Primary dressin piece black foam into depth of wound bed, 1 button over the center of wound bed (to avoid blister at lateral end of wound) to accommodate trac pad (2 black total)   Secondary Dressing: drape, vac functioning at 125mmHg continuous with no leaks noted.    Other: cannister changed by patient during appoitment     Patient Education: POC and wound progress discussed with pt. Pt able to verbalizes how to patch leak and what to do if he is unable to do so in 2 hours. Reviewed s/s of infection, and when to go  to ER. Pt verbalizes understanding to all.    Professional Collaboration: None today      Assessment:      Wound etiology: open surgical wound (left open. Did not dehisce.)    Wound Progress: Wound smaller per measurement    Rationale for Treatment: NPWT to encourage rapid granulation tissue formation and manage drainage.    Patient tolerance/compliance: Pt agreeable with poc/appt schedule.    Complicating factors: infection    Need for ongoing Advanced Wound Care services: continued skilled wound care for debridement as needed, dressing management and skilled clinical observation to prevent complications and expedite healing.    Plan:      Treatment Plan and Recommendations:  Diagnosis/ICD10: K35.3    Procedures/CPT: Established visit level 2, nonselective debridement 47237, NPWT 50205    Frequency: 3x/week for 30 minute appts.    Treatment Goals: STG 2 Weeks  LTG 4 Weeks   Granulation Tissue: 100% 100%   Decrease Necrotic Tissue to: 0% 0%   Wound Phase:  proliferation proliferation   Decrease Size by: 25% 50%   Periwound:  Remains intact Remains intact   Decrease tracts/undermining by: 100% n/a   Decrease Pain:  2/10 none       At the time of each visit a thorough assessment of the patient is completed to assure the  appropriateness of our plan of care.  The dressings or modalities may need to be adapted   from the original plan to address any significant changes in the wound environment.      Clinician Signature:_______________________________Date__________________      Physician Signature:______________________________Date:__________________

## 2018-04-30 ENCOUNTER — NON-PROVIDER VISIT (OUTPATIENT)
Dept: WOUND CARE | Facility: MEDICAL CENTER | Age: 53
End: 2018-04-30
Attending: SURGERY
Payer: COMMERCIAL

## 2018-04-30 PROCEDURE — 97605 NEG PRS WND THER DME<=50SQCM: CPT

## 2018-04-30 NOTE — WOUND TEAM
Advanced Wound Care  [facility]  [address]  [address]  [place] [address]  [phone] Fax: [phone]    Encounter Note  For Certification Period: 04/13/18 - 07/03/18    Referring Physician: Prashant Chase MD  Primary Physician:  Shashi Elizalde MD         Wound(s): RLQ abdomen  Start of Care: 04/13/18       Subjective:        HPI:  53 y/o male admitted to [facility] through ER 4/6 through 4/10/18.  He had a perforated appendicitis and underwent surgery with Dr. Chase 4/6/18.  He also developed atrial flutter, currently controlled by medications, and has a cardiology referral pending.  Wound vac was placed in hospital.                  Pain:  Denies any    Current Medications:    Current Outpatient Prescriptions:   •  amiodarone (CORDARONE) 200 MG Tab, Take 1 Tab by mouth every day., Disp: 30 Tab, Rfl: 3  •  zolpidem (AMBIEN) 5 MG Tab, Take 5 mg by mouth at bedtime as needed for Sleep., Disp: , Rfl:   •  potassium chloride SA (KDUR) 20 MEQ Tab CR, Take 1 Tab by mouth every day., Disp: 30 Tab, Rfl: 0  •  amoxicillin-clavulanate (AUGMENTIN) 875-125 MG Tab, Take 1 Tab by mouth 2 times a day., Disp: 6 Tab, Rfl: 0  •  carvedilol (COREG) 12.5 MG Tab, Take 6.25 mg by mouth 2 times a day, with meals., Disp: , Rfl:   •  valsartan (DIOVAN) 160 MG Tab, Take 160 mg by mouth every day., Disp: , Rfl:   •  aspirin EC (ECOTRIN) 325 MG Tablet Delayed Response, Take 325 mg by mouth every day., Disp: , Rfl:   •  omeprazole (PRILOSEC) 20 MG delayed-release capsule, Take 20 mg by mouth every day., Disp: , Rfl:   •  loratadine (CLARITIN) 10 MG Tab, Take 10 mg by mouth every day., Disp: , Rfl:     Allergies:  Nkda [no known drug allergy]     Objective:      Tests and Measures:  04/13/18: none today    Orthotic, protective, supportive devices:  04/13/18: none    Fall Risk Assessment (yolanda all that apply with an X): Completed upon initial eval 4/13/18: not a fall risk.              65 years or older                 Fall within the last 2 years, uses   Ambulatory devices   Loss of protective sensation in feet,    Use of prostethic/orthotic, years               Presence of lower extremity/foot/toe amputation              X Taking medication that increases risk (per facility policy)     Wound Characteristics                                                    Location:   RLQ abdomen Initial Evaluation  Date: 18 Encounter Date:  2018   Tissue Type and %: 40% adipose, 50% moist red, 10% soft purple/black(resolving hematoma?) 100% red granulation tissue   Periwound: intact Intact   Drainage: Min to mod ss  Minimal ss   Exposed structures adipose None   Wound Edges:   Open Open   Odor: None None   S&S of Infection:   None None   Edema: None None   Sensation: intact Intact               Measurements:   RLQ abdomen Initial Evaluation  Date: 18 Encounter Date: 2018   Length (cm) 2 0.8   Width (cm) 11 8.2   Depth (cm) 3.7 0.7   Area (cm2) 22 cm2 6.56 cm2   Tract/undermine Tract at 3 o'clock to 2cm. None       Procedures: Foam removed by clinician - wound bed inspected by this clinician, no remaining foam in wound bed   Debridement: Nonselective debridement using gauze/ns and cotton tipped applicator    Cleansed with: NS                                                                        Periwound protected with: No sting skin prep, drape   Primary dressin piece black foam into depth of wound bed, 1 button over the center of wound bed (to avoid blister at lateral end of wound) to accommodate trac pad (2 black total)   Secondary Dressing: drape, vac functioning at 125mmHg continuous with no leaks noted.    Other: cannister changed by patient during appoitment     Patient Education: POC and wound progress discussed with pt. Pt able to verbalizes how to patch leak and what to do if he is unable to do so in 2 hours. Reviewed s/s of infection, and when to go to ER. Likely to discontinue wound vac  this week. Pt verbalizes understanding to all.    Professional Collaboration: None today      Assessment:      Wound etiology: open surgical wound (left open. Did not dehisce.)    Wound Progress: Appears smaller per measurements.     Rationale for Treatment: NPWT to encourage rapid granulation tissue formation and manage drainage.    Patient tolerance/compliance: Pt agreeable with poc/appt schedule.    Complicating factors: Infection    Need for ongoing Advanced Wound Care services: continued skilled wound care for debridement as needed, dressing management and skilled clinical observation to prevent complications and expedite healing.    Plan:      Treatment Plan and Recommendations:  Diagnosis/ICD10: K35.3    Procedures/CPT: Established visit level 2, nonselective debridement 92646, NPWT 54862    Frequency: 3x/week for 30 minute appts.    Treatment Goals: STG 2 Weeks  LTG 4 Weeks   Granulation Tissue: 100% 100%   Decrease Necrotic Tissue to: 0% 0%   Wound Phase:  proliferation proliferation   Decrease Size by: 25% 50%   Periwound:  Remains intact Remains intact   Decrease tracts/undermining by: 100% n/a   Decrease Pain:  2/10 none       At the time of each visit a thorough assessment of the patient is completed to assure the  appropriateness of our plan of care.  The dressings or modalities may need to be adapted   from the original plan to address any significant changes in the wound environment.      Clinician Signature:_______________________________Date__________________      Physician Signature:______________________________Date:__________________

## 2018-05-02 ENCOUNTER — NON-PROVIDER VISIT (OUTPATIENT)
Dept: WOUND CARE | Facility: MEDICAL CENTER | Age: 53
End: 2018-05-02
Attending: SURGERY
Payer: COMMERCIAL

## 2018-05-02 PROCEDURE — 97602 WOUND(S) CARE NON-SELECTIVE: CPT

## 2018-05-02 NOTE — WOUND TEAM
Advanced Wound Care  [facility]  [address]  [address]  [place] [address]  [phone] Fax: [phone]    Encounter Note  For Certification Period: 04/13/18 - 07/03/18    Referring Physician: Prashant Chase MD  Primary Physician:  Shashi Elizalde MD         Wound(s): RLQ abdomen  Start of Care: 04/13/18       Subjective:        HPI:  51 y/o male admitted to [facility] through ER 4/6 through 4/10/18.  He had a perforated appendicitis and underwent surgery with Dr. Chase 4/6/18.  He also developed atrial flutter, currently controlled by medications, and has a cardiology referral pending.  Wound vac was placed in hospital.                  Pain:  Denies any pain today    Current Medications:    Current Outpatient Prescriptions:   •  amiodarone (CORDARONE) 200 MG Tab, Take 1 Tab by mouth every day., Disp: 30 Tab, Rfl: 3  •  zolpidem (AMBIEN) 5 MG Tab, Take 5 mg by mouth at bedtime as needed for Sleep., Disp: , Rfl:   •  potassium chloride SA (KDUR) 20 MEQ Tab CR, Take 1 Tab by mouth every day., Disp: 30 Tab, Rfl: 0  •  amoxicillin-clavulanate (AUGMENTIN) 875-125 MG Tab, Take 1 Tab by mouth 2 times a day., Disp: 6 Tab, Rfl: 0  •  carvedilol (COREG) 12.5 MG Tab, Take 6.25 mg by mouth 2 times a day, with meals., Disp: , Rfl:   •  valsartan (DIOVAN) 160 MG Tab, Take 160 mg by mouth every day., Disp: , Rfl:   •  aspirin EC (ECOTRIN) 325 MG Tablet Delayed Response, Take 325 mg by mouth every day., Disp: , Rfl:   •  omeprazole (PRILOSEC) 20 MG delayed-release capsule, Take 20 mg by mouth every day., Disp: , Rfl:   •  loratadine (CLARITIN) 10 MG Tab, Take 10 mg by mouth every day., Disp: , Rfl:     Allergies:  Nkda [no known drug allergy]     Objective:      Tests and Measures:  04/13/18: none today    Orthotic, protective, supportive devices:  04/13/18: none    Fall Risk Assessment (yolanda all that apply with an X): Completed upon initial eval 4/13/18: not a fall risk.              65 years or  older                Fall within the last 2 years, uses   Ambulatory devices   Loss of protective sensation in feet,    Use of prostethic/orthotic, years               Presence of lower extremity/foot/toe amputation              X Taking medication that increases risk (per facility policy)     Wound Characteristics                                                    Location:   RLQ abdomen Initial Evaluation  Date: 04/13/18 Encounter Date:  05/02/2018   Tissue Type and %: 40% adipose, 50% moist red, 10% soft purple/black(resolving hematoma?) 100% red granulation tissue   Periwound: intact Intact   Drainage: Min to mod ss  Minimal ss   Exposed structures adipose None   Wound Edges:   Open Open   Odor: None None   S&S of Infection:   None None   Edema: None None   Sensation: intact Intact               Measurements:   RLQ abdomen Initial Evaluation  Date: 04/13/18 Encounter Date: 04/27/2018   Length (cm) 2 0.8   Width (cm) 11 8.2   Depth (cm) 3.7 0.7   Area (cm2) 22 cm2 6.56 cm2   Tract/undermine Tract at 3 o'clock to 2cm. None     5/2/18 - Vac held today     Procedures: Foam removed by clinician - wound bed inspected by this clinician, no remaining foam in wound bed   Debridement: Nonselective debridement using gauze/ns and cotton tipped applicator    Cleansed with: NS                                                                        Periwound protected with: No sting skin prep   Primary dressing:  Aquacel rope into wound bed   Secondary Dressing: Non adhesive foam secured with hypafix tape   Other:      Patient Education: POC and wound progress discussed with pt. Discussed holding vac today and switching to aquacel dressing. Patient appears a little hesitant to have vac removed. Discussed that the wound is shallow and narrow. Asked that he bring the vac on Friday in case we need to replace it. Reinforced pt instr ss infection - erythema, edema, localized heat, fever/chills/N+V, when to call MD/go to ER. Pt with  good understanding.     Professional Collaboration: Notified [facility] that vac held.       Assessment:      Wound etiology: open surgical wound (left open. Did not dehisce.)    Wound Progress: Less depth and much more narrow; vac held today.     Rationale for Treatment: AqAg to manage bioburden, absorb exudate, and maintain moist wound environment without laterally wicking exudate therefore reducing hortencia-wound maceration, non adhesive foam for absorption and protection    Patient tolerance/compliance: Pt agreeable with poc    Complicating factors: Infection    Need for ongoing Advanced Wound Care services: continued skilled wound care for debridement as needed, dressing management and skilled clinical observation to prevent complications and expedite healing.    Plan:      Treatment Plan and Recommendations:  Diagnosis/ICD10: K35.3    Procedures/CPT: Established visit level 2, nonselective debridement 01385, NPWT 99439    Frequency: 3x/week for 30 minute appts.    Treatment Goals: STG 2 Weeks  LTG 4 Weeks   Granulation Tissue: 100% 100%   Decrease Necrotic Tissue to: 0% 0%   Wound Phase:  proliferation proliferation   Decrease Size by: 25% 50%   Periwound:  Remains intact Remains intact   Decrease tracts/undermining by: 100% n/a   Decrease Pain:  2/10 none       At the time of each visit a thorough assessment of the patient is completed to assure the  appropriateness of our plan of care.  The dressings or modalities may need to be adapted   from the original plan to address any significant changes in the wound environment.      Clinician Signature:_______________________________Date__________________      Physician Signature:______________________________Date:__________________

## 2018-05-04 ENCOUNTER — NON-PROVIDER VISIT (OUTPATIENT)
Dept: WOUND CARE | Facility: MEDICAL CENTER | Age: 53
End: 2018-05-04
Attending: SURGERY
Payer: COMMERCIAL

## 2018-05-04 PROCEDURE — 97602 WOUND(S) CARE NON-SELECTIVE: CPT

## 2018-05-04 NOTE — WOUND TEAM
Advanced Wound Care  [facility]  [address]  [address]  [place] [address]  [phone] Fax: [phone]    Encounter Note  For Certification Period: 04/13/18 - 07/03/18    Referring Physician: Prashant Chase MD  Primary Physician:  Shashi Elizalde MD         Wound(s): RLQ abdomen  Start of Care: 04/13/18       Subjective:        HPI:  51 y/o male admitted to [facility] through ER 4/6 through 4/10/18.  He had a perforated appendicitis and underwent surgery with Dr. Chase 4/6/18.  He also developed atrial flutter, currently controlled by medications, and has a cardiology referral pending.  Wound vac was placed in hospital.                  Pain:  Denies any pain today    Current Medications:    Current Outpatient Prescriptions:   •  amiodarone (CORDARONE) 200 MG Tab, Take 1 Tab by mouth every day., Disp: 30 Tab, Rfl: 3  •  zolpidem (AMBIEN) 5 MG Tab, Take 5 mg by mouth at bedtime as needed for Sleep., Disp: , Rfl:   •  potassium chloride SA (KDUR) 20 MEQ Tab CR, Take 1 Tab by mouth every day., Disp: 30 Tab, Rfl: 0  •  amoxicillin-clavulanate (AUGMENTIN) 875-125 MG Tab, Take 1 Tab by mouth 2 times a day., Disp: 6 Tab, Rfl: 0  •  carvedilol (COREG) 12.5 MG Tab, Take 6.25 mg by mouth 2 times a day, with meals., Disp: , Rfl:   •  valsartan (DIOVAN) 160 MG Tab, Take 160 mg by mouth every day., Disp: , Rfl:   •  aspirin EC (ECOTRIN) 325 MG Tablet Delayed Response, Take 325 mg by mouth every day., Disp: , Rfl:   •  omeprazole (PRILOSEC) 20 MG delayed-release capsule, Take 20 mg by mouth every day., Disp: , Rfl:   •  loratadine (CLARITIN) 10 MG Tab, Take 10 mg by mouth every day., Disp: , Rfl:     Allergies:  Nkda [no known drug allergy]     Objective:      Tests and Measures:  04/13/18: none    Orthotic, protective, supportive devices:  04/13/18: none    Fall Risk Assessment (yolanda all that apply with an X): Completed upon initial eval 4/13/18: not a fall risk.              65 years or older                 Fall within the last 2 years, uses   Ambulatory devices   Loss of protective sensation in feet,    Use of prostethic/orthotic, years               Presence of lower extremity/foot/toe amputation              X Taking medication that increases risk (per facility policy)     Wound Characteristics                                                    Location:   RLQ abdomen Initial Evaluation  Date: 04/13/18 Encounter Date:  05/04/2018   Tissue Type and %: 40% adipose, 50% moist red, 10% soft purple/black(resolving hematoma?) 100% red granulation tissue   Periwound: intact Scant periwound irritation at previous tape border   Drainage: Min to mod ss  Minimal ss   Exposed structures adipose None   Wound Edges:   Open Open   Odor: None None   S&S of Infection:   None None   Edema: None None   Sensation: intact Intact               Measurements:   RLQ abdomen Initial Evaluation  Date: 04/13/18 Encounter Date:  05/04/2018   Length (cm) 2 0.3   Width (cm) 11 7.6   Depth (cm) 3.7 0.1   Area (cm2) 22 cm2 2.28 cm2   Tract/undermine Tract at 3 o'clock to 2cm. None     5/2/18 - Vac held today     Procedures:    Debridement: Nonselective debridement using gauze/ns and cotton tipped applicator    Cleansed with: NS                                                                        Periwound protected with: No sting skin prep   Primary dressing:  Aquacel rope into wound bed   Secondary Dressing: Non adhesive foam secured with hypafix tape with minimal size   Other:      Patient Education: POC and wound progress discussed with pt. Instructed patient to return NPWT machine to [facility]. Reinforced pt instr ss infection - erythema, edema, localized heat, fever/chills/N+V, when to call MD/go to ER. Instructed how to change dressing if saturated or dislodged. Pt with good understanding.     Professional Collaboration: Notified [facility] that vac held.       Assessment:      Wound etiology: open surgical wound (left open. Did not  dehisce.)    Wound Progress: zulma in size    Rationale for Treatment: AqAg to manage bioburden, absorb exudate, and maintain moist wound environment without laterally wicking exudate therefore reducing hortencia-wound maceration, non adhesive foam for absorption and protection    Patient tolerance/compliance: Pt agreeable with poc    Complicating factors: Infection    Need for ongoing Advanced Wound Care services: continued skilled wound care for debridement as needed, dressing management and skilled clinical observation to prevent complications and expedite healing.    Plan:      Treatment Plan and Recommendations:  Diagnosis/ICD10: K35.3    Procedures/CPT: Established visit level 2, nonselective debridement 12589, NPWT 70840    Frequency: 2x/week for 30 minute appts.    Treatment Goals: STG 2 Weeks  LTG 4 Weeks   Granulation Tissue: 100% 100%   Decrease Necrotic Tissue to: 0% 0%   Wound Phase:  proliferation proliferation   Decrease Size by: 25% 50%   Periwound:  Remains intact Remains intact   Decrease tracts/undermining by: 100% n/a   Decrease Pain:  2/10 none       At the time of each visit a thorough assessment of the patient is completed to assure the  appropriateness of our plan of care.  The dressings or modalities may need to be adapted   from the original plan to address any significant changes in the wound environment.      Clinician Signature:_______________________________Date__________________      Physician Signature:______________________________Date:__________________

## 2018-05-08 ENCOUNTER — NON-PROVIDER VISIT (OUTPATIENT)
Dept: WOUND CARE | Facility: MEDICAL CENTER | Age: 53
End: 2018-05-08
Attending: SURGERY
Payer: COMMERCIAL

## 2018-05-08 PROCEDURE — 97602 WOUND(S) CARE NON-SELECTIVE: CPT

## 2018-05-08 NOTE — WOUND TEAM
Advanced Wound Care  [facility]  [address]  [address]  [place] [address]  [phone] Fax: [phone]    Encounter Note  For Certification Period: 04/13/18 - 07/03/18    Referring Physician: Prashant Chase MD  Primary Physician:  Shashi Elizalde MD         Wound(s): RLQ abdomen  Start of Care: 04/13/18       Subjective:        HPI:  53 y/o male admitted to [facility] through ER 4/6 through 4/10/18.  He had a perforated appendicitis and underwent surgery with Dr. Chase 4/6/18.  He also developed atrial flutter, currently controlled by medications, and has a cardiology referral pending.  Wound vac was placed in hospital.                  Pain:  Denies any pain today    Current Medications:    Current Outpatient Prescriptions:   •  amiodarone (CORDARONE) 200 MG Tab, Take 1 Tab by mouth every day., Disp: 30 Tab, Rfl: 3  •  zolpidem (AMBIEN) 5 MG Tab, Take 5 mg by mouth at bedtime as needed for Sleep., Disp: , Rfl:   •  potassium chloride SA (KDUR) 20 MEQ Tab CR, Take 1 Tab by mouth every day., Disp: 30 Tab, Rfl: 0  •  amoxicillin-clavulanate (AUGMENTIN) 875-125 MG Tab, Take 1 Tab by mouth 2 times a day., Disp: 6 Tab, Rfl: 0  •  carvedilol (COREG) 12.5 MG Tab, Take 6.25 mg by mouth 2 times a day, with meals., Disp: , Rfl:   •  valsartan (DIOVAN) 160 MG Tab, Take 160 mg by mouth every day., Disp: , Rfl:   •  aspirin EC (ECOTRIN) 325 MG Tablet Delayed Response, Take 325 mg by mouth every day., Disp: , Rfl:   •  omeprazole (PRILOSEC) 20 MG delayed-release capsule, Take 20 mg by mouth every day., Disp: , Rfl:   •  loratadine (CLARITIN) 10 MG Tab, Take 10 mg by mouth every day., Disp: , Rfl:     Allergies:  Nkda [no known drug allergy]     Objective:      Tests and Measures:  04/13/18: none    Orthotic, protective, supportive devices:  04/13/18: none    Fall Risk Assessment (yolanda all that apply with an X): Completed upon initial eval 4/13/18: not a fall risk.     Wound Characteristics                                                     Location:   RLQ abdomen Initial Evaluation  Date: 04/13/18 Encounter Date:  05/08/2018   Tissue Type and %: 40% adipose, 50% moist red, 10% soft purple/black(resolving hematoma?) 100% red granulation tissue, nearly closed   Periwound: intact Mild hortencia wound irritation appears to be related to having had hortencia wound shaven.   Drainage: Min to mod ss  Minimal ss   Exposed structures adipose None   Wound Edges:   Open Open   Odor: None None   S&S of Infection:   None None   Edema: None None   Sensation: intact Intact               Measurements:   RLQ abdomen Initial Evaluation  Date: 04/13/18 Encounter Date:  05/04/2018   Length (cm) 2 0.3   Width (cm) 11 7.6   Depth (cm) 3.7 0.1   Area (cm2) 22 cm2 2.28 cm2   Tract/undermine Tract at 3 o'clock to 2cm. None     Procedures:    Debridement: Nonselective debridement using gauze/ns and cotton tipped applicator    Cleansed with: NS                                                                        Periwound protected with: No sting skin prep   Primary dressing:  NS moistened yessica   Secondary Dressing: Non adhesive foam secured with hypafix tape   Other:      Patient Education: POC and wound progress discussed with pt. Wound improved and expect full resolution in one to two weeks.  Reviewed s/sx infection - erythema, edema, localized heat, fever/chills/N+V, when to call MD/go to ER.  Instructed on need to keep dsng CDI, cover with bathing, only change if dressing becomes wet/soiled/falling off.  Pt expresses good understanding of instructions.    Professional Collaboration: none today    Assessment:      Wound etiology: open surgical wound (left open. Did not dehisce.)    Wound Progress: no measurement today, but visibly smaller.  Nearly resolved.    Rationale for Treatment:  Yessica to combat chronic inflammation and provide collagen scaffolding to promote granulation.  Nonad foam to absorb/pad/protect.    Patient  tolerance/compliance: Pt agreeable with poc    Complicating factors: Infection    Need for ongoing Advanced Wound Care services: continued skilled wound care for debridement as needed, dressing management and skilled clinical observation to prevent complications and expedite healing.    Plan:      Treatment Plan and Recommendations:  Diagnosis/ICD10: K35.3    Procedures/CPT: nonselective debridement 25112    Frequency: 2x/week for 30 minute appts.    Treatment Goals: STG 2 Weeks  LTG 4 Weeks   Granulation Tissue: resolved Resolved   Decrease Necrotic Tissue to: 0% 0%   Wound Phase:  maturation maturation   Decrease Size by: 100%    Periwound:  Irritation resolved intact   Decrease tracts/undermining by: n/a n/a   Decrease Pain:  none none       At the time of each visit a thorough assessment of the patient is completed to assure the  appropriateness of our plan of care.  The dressings or modalities may need to be adapted   from the original plan to address any significant changes in the wound environment.      Clinician Signature:_______________________________Date__________________      Physician Signature:______________________________Date:__________________

## 2018-05-11 ENCOUNTER — NON-PROVIDER VISIT (OUTPATIENT)
Dept: WOUND CARE | Facility: MEDICAL CENTER | Age: 53
End: 2018-05-11
Attending: SURGERY
Payer: COMMERCIAL

## 2018-05-11 PROCEDURE — 97602 WOUND(S) CARE NON-SELECTIVE: CPT

## 2018-05-11 NOTE — WOUND TEAM
Advanced Wound Care  [facility]  [address]  [address]  [place] [address]  [phone] Fax: [phone]    Encounter Note  For Certification Period: 04/13/18 - 07/03/18    Referring Physician: Prashant Chase MD  Primary Physician:  Shashi Elizalde MD         Wound(s): RLQ abdomen  Start of Care: 04/13/18       Subjective:        HPI:  51 y/o male admitted to [facility] through ER 4/6 through 4/10/18.  He had a perforated appendicitis and underwent surgery with Dr. Chase 4/6/18.  He also developed atrial flutter, currently controlled by medications, and has a cardiology referral pending.  Wound vac was placed in hospital.                  Pain:  Denies pain.  States he is now able to comfortably sleep on his stomach.    Current Medications:    Current Outpatient Prescriptions:   •  amiodarone (CORDARONE) 200 MG Tab, Take 1 Tab by mouth every day., Disp: 30 Tab, Rfl: 3  •  zolpidem (AMBIEN) 5 MG Tab, Take 5 mg by mouth at bedtime as needed for Sleep., Disp: , Rfl:   •  potassium chloride SA (KDUR) 20 MEQ Tab CR, Take 1 Tab by mouth every day., Disp: 30 Tab, Rfl: 0  •  amoxicillin-clavulanate (AUGMENTIN) 875-125 MG Tab, Take 1 Tab by mouth 2 times a day., Disp: 6 Tab, Rfl: 0  •  carvedilol (COREG) 12.5 MG Tab, Take 6.25 mg by mouth 2 times a day, with meals., Disp: , Rfl:   •  valsartan (DIOVAN) 160 MG Tab, Take 160 mg by mouth every day., Disp: , Rfl:   •  aspirin EC (ECOTRIN) 325 MG Tablet Delayed Response, Take 325 mg by mouth every day., Disp: , Rfl:   •  omeprazole (PRILOSEC) 20 MG delayed-release capsule, Take 20 mg by mouth every day., Disp: , Rfl:   •  loratadine (CLARITIN) 10 MG Tab, Take 10 mg by mouth every day., Disp: , Rfl:     Allergies:  Nkda [no known drug allergy]     Objective:      Tests and Measures:  04/13/18: none    Orthotic, protective, supportive devices:  04/13/18: none    Fall Risk Assessment (yolanda all that apply with an X): Completed upon initial eval 4/13/18:  not a fall risk.     Wound Characteristics                                                    Location:   RLQ abdomen Initial Evaluation  Date: 04/13/18 Encounter Date:  05/11/2018   Tissue Type and %: 40% adipose, 50% moist red, 10% soft purple/black(resolving hematoma?) 100% red granulation tissue, nearly closed   Periwound: intact Christel wound irritation resolving.  Scar, intact.   Drainage: Min to mod ss  Minimal ss   Exposed structures adipose None   Wound Edges:   Open Open   Odor: None None   S&S of Infection:   None None   Edema: None None   Sensation: intact Intact               Measurements:   RLQ abdomen Initial Evaluation  Date: 04/13/18 Encounter Date:  05/11/2018   Length (cm) 2 0.2   Width (cm) 11 4   Depth (cm) 3.7 0.1   Area (cm2) 22 cm2 0.8 cm2   Tract/undermine Tract at 3 o'clock to 2cm. None         Procedures:    Debridement: Nonselective debridement using gauze/ns and cotton tipped applicator    Cleansed with: NS                                                                        Periwound protected with: No sting skin prep   Primary dressing:  NS moistened marsha   Secondary Dressing: Non adhesive foam secured with hypafix tape   Other:      Patient Education: POC and wound progress discussed with pt. Wound continues to improve.  Expect full resolution in one to two weeks.  Reviewed s/sx infection - erythema, edema, localized heat, fever/chills/N+V, when to call MD/go to ER.  Instructed on need to keep dsng CDI, cover with bathing, only change if dressing becomes wet/soiled/falling off.  Pt expresses good understanding of instructions.      Professional Collaboration: none today    Assessment:      Wound etiology: open surgical wound (left open. Did not dehisce.)    Wound Progress: measures smaller.  Medial half of wound now resolved.    Rationale for Treatment:  Marsha to combat chronic inflammation and provide collagen scaffolding to promote granulation.  Nonad foam to  absorb/pad/protect.    Patient tolerance/compliance: Pt agreeable with poc    Complicating factors:none    Need for ongoing Advanced Wound Care services: continued skilled wound care for debridement as needed, dressing management and skilled clinical observation to prevent complications and expedite healing.    Plan:      Treatment Plan and Recommendations:  Diagnosis/ICD10: K35.3    Procedures/CPT: nonselective debridement 02237    Frequency: 2x/week for 30 minute appts.    Treatment Goals: STG 2 Weeks  LTG 4 Weeks   Granulation Tissue: resolved Resolved   Decrease Necrotic Tissue to: 0% 0%   Wound Phase:  maturation maturation   Decrease Size by: 100%    Periwound:  Irritation resolved intact   Decrease tracts/undermining by: n/a n/a   Decrease Pain:  none none       At the time of each visit a thorough assessment of the patient is completed to assure the  appropriateness of our plan of care.  The dressings or modalities may need to be adapted   from the original plan to address any significant changes in the wound environment.      Clinician Signature:_______________________________Date__________________      Physician Signature:______________________________Date:__________________

## 2018-05-14 ENCOUNTER — NON-PROVIDER VISIT (OUTPATIENT)
Dept: WOUND CARE | Facility: MEDICAL CENTER | Age: 53
End: 2018-05-14
Attending: SURGERY
Payer: COMMERCIAL

## 2018-05-14 PROCEDURE — 97602 WOUND(S) CARE NON-SELECTIVE: CPT

## 2018-05-14 NOTE — WOUND TEAM
Advanced Wound Care  [facility]  [address]  [address]  [place] [address]  [phone] Fax: [phone]    Encounter Note  For Certification Period: 04/13/18 - 07/03/18    Referring Physician: Prashant Chase MD  Primary Physician:  Shashi Elizalde MD         Wound(s): RLQ abdomen  Start of Care: 04/13/18       Subjective:        HPI:  53 y/o male admitted to [facility] through ER 4/6 through 4/10/18.  He had a perforated appendicitis and underwent surgery with Dr. Chase 4/6/18.  He also developed atrial flutter, currently controlled by medications, and has a cardiology referral pending.  Wound vac was placed in hospital.                  Pain:  Denies pain.    Current Medications:    Current Outpatient Prescriptions:   •  amiodarone (CORDARONE) 200 MG Tab, Take 1 Tab by mouth every day., Disp: 30 Tab, Rfl: 3  •  zolpidem (AMBIEN) 5 MG Tab, Take 5 mg by mouth at bedtime as needed for Sleep., Disp: , Rfl:   •  potassium chloride SA (KDUR) 20 MEQ Tab CR, Take 1 Tab by mouth every day., Disp: 30 Tab, Rfl: 0  •  amoxicillin-clavulanate (AUGMENTIN) 875-125 MG Tab, Take 1 Tab by mouth 2 times a day., Disp: 6 Tab, Rfl: 0  •  carvedilol (COREG) 12.5 MG Tab, Take 6.25 mg by mouth 2 times a day, with meals., Disp: , Rfl:   •  valsartan (DIOVAN) 160 MG Tab, Take 160 mg by mouth every day., Disp: , Rfl:   •  aspirin EC (ECOTRIN) 325 MG Tablet Delayed Response, Take 325 mg by mouth every day., Disp: , Rfl:   •  omeprazole (PRILOSEC) 20 MG delayed-release capsule, Take 20 mg by mouth every day., Disp: , Rfl:   •  loratadine (CLARITIN) 10 MG Tab, Take 10 mg by mouth every day., Disp: , Rfl:     Allergies:  Nkda [no known drug allergy]     Objective:      Tests and Measures:  04/13/18: none    Orthotic, protective, supportive devices:  04/13/18: none    Fall Risk Assessment (yolanda all that apply with an X): Completed upon initial eval 4/13/18: not a fall risk.     Wound Characteristics                                                     Location:   RLQ abdomen Initial Evaluation  Date: 04/13/18 Encounter Date:  05/14/2018   Tissue Type and %: 40% adipose, 50% moist red, 10% soft purple/black(resolving hematoma?) 100% red hypergranular tissue   Periwound: intact Christel wound irritation nearly resolved.  Scar, intact.   Drainage: Min to mod ss  Min serous   Exposed structures adipose None   Wound Edges:   Open Open   Odor: None None   S&S of Infection:   None None   Edema: None None   Sensation: intact Intact               Measurements:   RLQ abdomen Initial Evaluation  Date: 04/13/18 Encounter Date:  05/11/2018   Length (cm) 2 0.2   Width (cm) 11 4   Depth (cm) 3.7 0.1   Area (cm2) 22 cm2 0.8 cm2   Tract/undermine Tract at 3 o'clock to 2cm. None       Procedures:    Debridement: Nonselective debridement using gauze/ns and cotton tipped applicator.  Silver nitrate application to all areas of hypergranulation.     Cleansed with: NS                                                                        Periwound protected with: No sting skin prep   Primary dressing:  hydrofera blue   Secondary Dressing: secured with hypafix   Other:      Patient Education: POC and wound progress discussed with pt. Rationale for silver nitrate and change to dressing taught.  Reviewed s/sx infection - erythema, edema, localized heat, fever/chills/N+V, when to call MD/go to ER.  Instructed on need to keep dsng CDI, cover with bathing, only change if dressing becomes wet/soiled/falling off.  Pt expresses good understanding of instructions.      Professional Collaboration: none today    Assessment:      Wound etiology: open surgical wound (left open. Did not dehisce.)    Wound Progress: no measurment today.  Hypergranulation noted.    Rationale for Treatment:  hydrofera blue to treat hypergranulation, for antimicrobial properties, and to absorb drainage.    Patient tolerance/compliance: Pt agreeable with poc    Complicating factors:none    Need  for ongoing Advanced Wound Care services: continued skilled wound care for debridement as needed, dressing management and skilled clinical observation to prevent complications and expedite healing.    Plan:      Treatment Plan and Recommendations:  Diagnosis/ICD10: K35.3    Procedures/CPT: nonselective debridement 88521, silver nitrate application    Frequency: 2x/week for 30 minute appts.    Treatment Goals: STG 2 Weeks  LTG 4 Weeks   Granulation Tissue: resolved Resolved   Decrease Necrotic Tissue to: 0% 0%   Wound Phase:  maturation maturation   Decrease Size by: 100%    Periwound:  Irritation resolved intact   Decrease tracts/undermining by: n/a n/a   Decrease Pain:  none none       At the time of each visit a thorough assessment of the patient is completed to assure the  appropriateness of our plan of care.  The dressings or modalities may need to be adapted   from the original plan to address any significant changes in the wound environment.      Clinician Signature:_______________________________Date__________________      Physician Signature:______________________________Date:__________________

## 2018-05-18 ENCOUNTER — NON-PROVIDER VISIT (OUTPATIENT)
Dept: WOUND CARE | Facility: MEDICAL CENTER | Age: 53
End: 2018-05-18
Attending: SURGERY
Payer: COMMERCIAL

## 2018-05-18 PROCEDURE — 97602 WOUND(S) CARE NON-SELECTIVE: CPT

## 2018-05-18 NOTE — WOUND TEAM
Advanced Wound Care  [facility]  [address]  [address]  [place] [address]  [phone] Fax: [phone]    Encounter Note  For Certification Period: 04/13/18 - 07/03/18    Referring Physician: Prashant Chase MD  Primary Physician:  Shashi Elizalde MD         Wound(s): RLQ abdomen  Start of Care: 04/13/18       Subjective:        HPI:  53 y/o male admitted to [facility] through ER 4/6 through 4/10/18.  He had a perforated appendicitis and underwent surgery with Dr. Chase 4/6/18.  He also developed atrial flutter, currently controlled by medications, and has a cardiology referral pending.  Wound vac was placed in hospital.                  Pain:  Denies pain    Current Medications:    Current Outpatient Prescriptions:   •  amiodarone (CORDARONE) 200 MG Tab, Take 1 Tab by mouth every day., Disp: 30 Tab, Rfl: 3  •  zolpidem (AMBIEN) 5 MG Tab, Take 5 mg by mouth at bedtime as needed for Sleep., Disp: , Rfl:   •  potassium chloride SA (KDUR) 20 MEQ Tab CR, Take 1 Tab by mouth every day., Disp: 30 Tab, Rfl: 0  •  amoxicillin-clavulanate (AUGMENTIN) 875-125 MG Tab, Take 1 Tab by mouth 2 times a day., Disp: 6 Tab, Rfl: 0  •  carvedilol (COREG) 12.5 MG Tab, Take 6.25 mg by mouth 2 times a day, with meals., Disp: , Rfl:   •  valsartan (DIOVAN) 160 MG Tab, Take 160 mg by mouth every day., Disp: , Rfl:   •  aspirin EC (ECOTRIN) 325 MG Tablet Delayed Response, Take 325 mg by mouth every day., Disp: , Rfl:   •  omeprazole (PRILOSEC) 20 MG delayed-release capsule, Take 20 mg by mouth every day., Disp: , Rfl:   •  loratadine (CLARITIN) 10 MG Tab, Take 10 mg by mouth every day., Disp: , Rfl:     Allergies:  Nkda [no known drug allergy]     Objective:      Tests and Measures:  04/13/18: none    Orthotic, protective, supportive devices:  04/13/18: none    Fall Risk Assessment (yolanda all that apply with an X): Completed upon initial eval 4/13/18: not a fall risk.     Wound Characteristics                                                     Location:   RLQ abdomen Initial Evaluation  Date: 04/13/18 Encounter Date:  05/14/2018 Encounter Date:  05/18/2018   Tissue Type and %: 40% adipose, 50% moist red, 10% soft purple/black(resolving hematoma?) 100% red hypergranular tissue 100% red hypergranular tissue   Periwound: intact Christel wound irritation nearly resolved.  Scar, intact. intact   Drainage: Min to mod ss  Min serous Min SS   Exposed structures adipose None none   Wound Edges:   Open Open open   Odor: None None none   S&S of Infection:   None None none   Edema: None None none   Sensation: intact Intact intact               Measurements:   RLQ abdomen Initial Evaluation  Date: 04/13/18 Encounter Date:  05/11/2018 Encounter Date:  05/18/2018   Length (cm) 2 0.2 0.3   Width (cm) 11 4 2.8   Depth (cm) 3.7 0.1 hypergranulation   Area (cm2) 22 cm2 0.8 cm2 0.84   Tract/undermine Tract at 3 o'clock to 2cm. None none           Procedures:    Debridement: Nonselective debridement using gauze/ns and cotton tipped applicator.  Silver nitrate application to all areas of hypergranulation.     Cleansed with: NS                                                                        Periwound protected with: No sting skin prep   Primary dressing:  Honey colloid   Secondary Dressing: secured with transparent film   Other:      Patient Education: Reviewed POC, importance of keeping the secondary dressing dry and intact, nutrition for wound healing, s/s of complications/infection, when to notify MD/go to ER.  Pt verbalized understanding to all.     Professional Collaboration: none today    Assessment:      Wound etiology: open surgical wound (left open. Did not dehisce.)    Wound Progress: Hypergranulation, decreased length    Rationale for Treatment:  Honeycolloid to provide coverage/protect wound, control exudate, enhance autolytic debridement, to maintain moist wound environment, and for antimicrobial properties    Patient  tolerance/compliance: Pt agreeable with poc    Complicating factors:none    Need for ongoing Advanced Wound Care services: continued skilled wound care for debridement as needed, dressing management and skilled clinical observation to prevent complications and expedite healing.    Plan:      Treatment Plan and Recommendations:  Diagnosis/ICD10: K35.3    Procedures/CPT: nonselective debridement 32527, silver nitrate application    Frequency: 2x/week for 30 minute appts.    Treatment Goals: STG 2 Weeks  LTG 4 Weeks   Granulation Tissue: resolved Resolved   Decrease Necrotic Tissue to: 0% 0%   Wound Phase:  maturation maturation   Decrease Size by: 100%    Periwound:  Irritation resolved intact   Decrease tracts/undermining by: n/a n/a   Decrease Pain:  none none       At the time of each visit a thorough assessment of the patient is completed to assure the  appropriateness of our plan of care.  The dressings or modalities may need to be adapted   from the original plan to address any significant changes in the wound environment.      Clinician Signature:_______________________________Date__________________      Physician Signature:______________________________Date:__________________

## 2018-05-21 ENCOUNTER — NON-PROVIDER VISIT (OUTPATIENT)
Dept: WOUND CARE | Facility: MEDICAL CENTER | Age: 53
End: 2018-05-21
Attending: SURGERY
Payer: COMMERCIAL

## 2018-05-21 PROCEDURE — 97602 WOUND(S) CARE NON-SELECTIVE: CPT

## 2018-05-21 NOTE — WOUND TEAM
Advanced Wound Care  [facility]  [address]  [address]  [place] [address]  [phone] Fax: [phone]    Encounter Note  For Certification Period: 04/13/18 - 07/03/18    Referring Physician: Prashant Chase MD  Primary Physician:  Shashi Elizalde MD         Wound(s): RLQ abdomen  Start of Care: 04/13/18       Subjective:        HPI:  53 y/o male admitted to [facility] through ER 4/6 through 4/10/18.  He had a perforated appendicitis and underwent surgery with Dr. Chase 4/6/18.  He also developed atrial flutter, currently controlled by medications, and has a cardiology referral pending.  Wound vac was placed in hospital.                  Pain:  Denies pain    Current Medications:    Current Outpatient Prescriptions:   •  amiodarone (CORDARONE) 200 MG Tab, Take 1 Tab by mouth every day., Disp: 30 Tab, Rfl: 3  •  zolpidem (AMBIEN) 5 MG Tab, Take 5 mg by mouth at bedtime as needed for Sleep., Disp: , Rfl:   •  potassium chloride SA (KDUR) 20 MEQ Tab CR, Take 1 Tab by mouth every day., Disp: 30 Tab, Rfl: 0  •  amoxicillin-clavulanate (AUGMENTIN) 875-125 MG Tab, Take 1 Tab by mouth 2 times a day., Disp: 6 Tab, Rfl: 0  •  carvedilol (COREG) 12.5 MG Tab, Take 6.25 mg by mouth 2 times a day, with meals., Disp: , Rfl:   •  valsartan (DIOVAN) 160 MG Tab, Take 160 mg by mouth every day., Disp: , Rfl:   •  aspirin EC (ECOTRIN) 325 MG Tablet Delayed Response, Take 325 mg by mouth every day., Disp: , Rfl:   •  omeprazole (PRILOSEC) 20 MG delayed-release capsule, Take 20 mg by mouth every day., Disp: , Rfl:   •  loratadine (CLARITIN) 10 MG Tab, Take 10 mg by mouth every day., Disp: , Rfl:     Allergies:  Nkda [no known drug allergy]     Objective:      Tests and Measures:  04/13/18: none    Orthotic, protective, supportive devices:  04/13/18: none    Fall Risk Assessment (yolanda all that apply with an X): Completed upon initial eval 4/13/18: not a fall risk.     Wound Characteristics                                                     Location:   RLQ abdomen Initial Evaluation  Date: 04/13/18 Encounter Date:  05/21/2018   Tissue Type and %: 40% adipose, 50% moist red, 10% soft purple/black(resolving hematoma?) 100% moist red tissue   Periwound: intact intact   Drainage: Min to mod ss  SAVANNAH, pt states dressing was too wet from drainge/honey and had to take it off   Exposed structures adipose none   Wound Edges:   Open open   Odor: None none   S&S of Infection:   None none   Edema: None none   Sensation: intact intact               Measurements:   RLQ abdomen Initial Evaluation  Date: 04/13/18 Encounter Date:  05/18/2018  Two openings measured as one   Length (cm) 2 0.3   Width (cm) 11 5.7   Depth (cm) 3.7 <0.1   Area (cm2) 22 cm2 1.71 cm2   Tract/undermine Tract at 3 o'clock to 2cm. none               Procedures:    Debridement: Nonselective debridement using gauze/ns and cotton tipped applicator.   Cleansed with: NS                                                                        Periwound protected with: No sting skin prep   Primary dressing:  Aquacel Ag strip   Secondary Dressing: Non-adhesive foam secured with hypafix   Other:      Patient Education: POC and wound progress discussed with pt. Pt came in with wound dressed with his own bandaid, states honey dressing got very wet from honey/drainage and was leaking so he had to change dressing. Dicussed raionale for aquacel silver and foam, pt agreeable. Reviewed importance of keeping the secondary dressing dry and intact, nutrition for wound healing, s/s of complications/infection, when to notify MD/go to ER.  Pt verbalized understanding to all.     Professional Collaboration: none today    Assessment:      Wound etiology: open surgical wound (left open. Did not dehisce.)    Wound Progress: No hypergranulation, however medial end reopened (total of two openings this visit)    Rationale for Treatment:  AqAg to manage bioburden, absorb exudate, and maintain moist  wound environment without laterally wicking exudate therefore reducing hortencia-wound maceration.    Patient tolerance/compliance: Pt agreeable with poc    Complicating factors:none    Need for ongoing Advanced Wound Care services: continued skilled wound care for debridement as needed, dressing management and skilled clinical observation to prevent complications and expedite healing.    Plan:      Treatment Plan and Recommendations:  Diagnosis/ICD10: K35.3    Procedures/CPT: nonselective debridement 94226    Frequency: 2x/week for 30 minute appts.    Treatment Goals: STG 2 Weeks  LTG 4 Weeks   Granulation Tissue: resolved Resolved   Decrease Necrotic Tissue to: 0% 0%   Wound Phase:  maturation maturation   Decrease Size by: 100%    Periwound:  Irritation resolved intact   Decrease tracts/undermining by: n/a n/a   Decrease Pain:  none none       At the time of each visit a thorough assessment of the patient is completed to assure the  appropriateness of our plan of care.  The dressings or modalities may need to be adapted   from the original plan to address any significant changes in the wound environment.      Clinician Signature:_______________________________Date__________________      Physician Signature:______________________________Date:__________________

## 2018-05-22 ENCOUNTER — OFFICE VISIT (OUTPATIENT)
Dept: CARDIOLOGY | Facility: MEDICAL CENTER | Age: 53
End: 2018-05-22
Payer: COMMERCIAL

## 2018-05-22 VITALS
BODY MASS INDEX: 29.77 KG/M2 | SYSTOLIC BLOOD PRESSURE: 120 MMHG | WEIGHT: 232 LBS | OXYGEN SATURATION: 94 % | HEIGHT: 74 IN | HEART RATE: 76 BPM | DIASTOLIC BLOOD PRESSURE: 80 MMHG

## 2018-05-22 DIAGNOSIS — Z86.79 HISTORY OF ATRIAL FLUTTER: ICD-10-CM

## 2018-05-22 DIAGNOSIS — I10 ESSENTIAL HYPERTENSION: ICD-10-CM

## 2018-05-22 DIAGNOSIS — Z82.49 FAMILY HISTORY OF ATRIAL FIBRILLATION: ICD-10-CM

## 2018-05-22 PROCEDURE — 99214 OFFICE O/P EST MOD 30 MIN: CPT | Performed by: INTERNAL MEDICINE

## 2018-05-22 RX ORDER — CARVEDILOL 6.25 MG/1
6.25 TABLET ORAL 2 TIMES DAILY WITH MEALS
Qty: 60 TAB | Refills: 5 | Status: SHIPPED | OUTPATIENT
Start: 2018-05-22 | End: 2018-12-12 | Stop reason: SDUPTHER

## 2018-05-22 ASSESSMENT — ENCOUNTER SYMPTOMS
SENSORY CHANGE: 1
DIZZINESS: 0
LOSS OF CONSCIOUSNESS: 0
SHORTNESS OF BREATH: 0
PALPITATIONS: 0

## 2018-05-23 NOTE — PROGRESS NOTES
Chief Complaint   Patient presents with   • Atrial Flutter     follow up   Hypertension    Subjective:   Jesse Norwood is a 52 y.o. male who presents today for f/u above issues and recent medication change    Has been on amiodarone 200 mg/d since last visit a month ago  Denies palpitations or SOB but did not perceive much palpitations during his atrial flutter while in the hospital  Has been checking his rhythm with an lucretia on his cell phone  All so far showed regular sinus rhythm  BP has been lower. No longer taking valsartan  Still taking carvedilol but only 6.25 mg BID      Past Medical History:   Diagnosis Date   • Allergy    • ASTHMA    • GERD (gastroesophageal reflux disease)     no ulcer/ EGD-    • Hyperlipidemia    • Hypertension      Past Surgical History:   Procedure Laterality Date   • APPENDECTOMY  4/6/2018    Procedure: OPEN APPENDECTOMY;  Surgeon: Prashant Chase M.D.;  Location: SURGERY [facility];  Service: General   • OTHER ORTHOPEDIC SURGERY      achilles     No family history on file.  Social History     Social History   • Marital status: Single     Spouse name: N/A   • Number of children: N/A   • Years of education: N/A     Occupational History   • Not on file.     Social History Main Topics   • Smoking status: Never Smoker   • Smokeless tobacco: Never Used   • Alcohol use Yes      Comment: occ   • Drug use: No   • Sexual activity: Not on file     Other Topics Concern   • Not on file     Social History Narrative   • No narrative on file     Allergies   Allergen Reactions   • Nkda [No Known Drug Allergy]      Outpatient Encounter Prescriptions as of 5/22/2018   Medication Sig Dispense Refill   • aspirin EC (ECOTRIN) 81 MG Tablet Delayed Response Take 81 mg by mouth every day.     • carvedilol (COREG) 6.25 MG Tab Take 1 Tab by mouth 2 times a day, with meals. 60 Tab 5   • amiodarone (CORDARONE) 200 MG Tab Take 1 Tab by mouth every day. 30 Tab 3   • omeprazole (PRILOSEC) 20 MG  "delayed-release capsule Take 20 mg by mouth every day.     • loratadine (CLARITIN) 10 MG Tab Take 10 mg by mouth every day.     • zolpidem (AMBIEN) 5 MG Tab Take 5 mg by mouth at bedtime as needed for Sleep.     • potassium chloride SA (KDUR) 20 MEQ Tab CR Take 1 Tab by mouth every day. (Patient not taking: Reported on 5/22/2018) 30 Tab 0   • amoxicillin-clavulanate (AUGMENTIN) 875-125 MG Tab Take 1 Tab by mouth 2 times a day. (Patient not taking: Reported on 5/22/2018) 6 Tab 0   • valsartan (DIOVAN) 160 MG Tab Take 160 mg by mouth every day.     • aspirin EC (ECOTRIN) 325 MG Tablet Delayed Response Take 325 mg by mouth every day.     • [DISCONTINUED] carvedilol (COREG) 12.5 MG Tab Take 6.25 mg by mouth 2 times a day, with meals.       No facility-administered encounter medications on file as of 5/22/2018.      Review of Systems   Constitutional:        Able to lose more weight   Respiratory: Negative for shortness of breath.    Cardiovascular: Negative for chest pain and palpitations.   Neurological: Positive for sensory change. Negative for dizziness and loss of consciousness.        Slightly numb small area lower left thigh but not new        Objective:   /80   Pulse 76   Ht 1.88 m (6' 2\")   Wt 105.2 kg (232 lb)   SpO2 94%   BMI 29.79 kg/m²     Physical Exam   Constitutional: He is oriented to person, place, and time.   Neck: No JVD present.   Cardiovascular: Normal rate and regular rhythm.  Exam reveals no gallop.    No murmur heard.  Pulmonary/Chest: Effort normal and breath sounds normal. No respiratory distress. He has no wheezes. He has no rales.   Abdominal: He exhibits no distension. There is no tenderness.   Musculoskeletal: He exhibits no edema.   Neurological: He is alert and oriented to person, place, and time.   Skin: Skin is warm. No erythema.   Psychiatric: He has a normal mood and affect.       Assessment:     1. Essential hypertension     2. History of atrial flutter     3. Family " history of atrial fibrillation      Mother       Medical Decision Making:  Today's Assessment / Status / Plan:     The patient's above cardiovascular conditions are stable.   There is no indication of recurrent atrial flutter.  Will try cutting amiodarone down further to 100 mg/d.  If no recurrent  Arrhythmias in a month or two, may try /dc amiodarone.  Would continue current carvedilol for now unless BP runs under 110 mmHg.  Will have the patient return for a followup in 6 months.   Will be happy to see the patient sooner as needed.   Thank you for allowing me to participate in the caring of this patient.

## 2018-05-25 ENCOUNTER — NON-PROVIDER VISIT (OUTPATIENT)
Dept: WOUND CARE | Facility: MEDICAL CENTER | Age: 53
End: 2018-05-25
Attending: SURGERY
Payer: COMMERCIAL

## 2018-05-25 PROCEDURE — 97602 WOUND(S) CARE NON-SELECTIVE: CPT

## 2018-05-25 NOTE — WOUND TEAM
Advanced Wound Care  [facility]  [address]  [address]  [place] [address]  [phone] Fax: [phone]    Encounter Note  For Certification Period: 04/13/18 - 07/03/18    Referring Physician: Prashant Chase MD  Primary Physician:  Shashi Elizalde MD         Wound(s): RLQ abdomen  Start of Care: 04/13/18       Subjective:        HPI:  53 y/o male admitted to [facility] through ER 4/6 through 4/10/18.  He had a perforated appendicitis and underwent surgery with Dr. Chase 4/6/18.  He also developed atrial flutter, currently controlled by medications, and has a cardiology referral pending.  Wound vac was placed in hospital.                  Pain:  Denies pain    Current Medications:    Current Outpatient Prescriptions:   •  aspirin EC (ECOTRIN) 81 MG Tablet Delayed Response, Take 81 mg by mouth every day., Disp: , Rfl:   •  carvedilol (COREG) 6.25 MG Tab, Take 1 Tab by mouth 2 times a day, with meals., Disp: 60 Tab, Rfl: 5  •  amiodarone (CORDARONE) 200 MG Tab, Take 1 Tab by mouth every day., Disp: 30 Tab, Rfl: 3  •  zolpidem (AMBIEN) 5 MG Tab, Take 5 mg by mouth at bedtime as needed for Sleep., Disp: , Rfl:   •  potassium chloride SA (KDUR) 20 MEQ Tab CR, Take 1 Tab by mouth every day. (Patient not taking: Reported on 5/22/2018), Disp: 30 Tab, Rfl: 0  •  amoxicillin-clavulanate (AUGMENTIN) 875-125 MG Tab, Take 1 Tab by mouth 2 times a day. (Patient not taking: Reported on 5/22/2018), Disp: 6 Tab, Rfl: 0  •  valsartan (DIOVAN) 160 MG Tab, Take 160 mg by mouth every day., Disp: , Rfl:   •  aspirin EC (ECOTRIN) 325 MG Tablet Delayed Response, Take 325 mg by mouth every day., Disp: , Rfl:   •  omeprazole (PRILOSEC) 20 MG delayed-release capsule, Take 20 mg by mouth every day., Disp: , Rfl:   •  loratadine (CLARITIN) 10 MG Tab, Take 10 mg by mouth every day., Disp: , Rfl:     Allergies:  Nkda [no known drug allergy]     Objective:      Tests and Measures:  04/13/18: none    Orthotic,  protective, supportive devices:  04/13/18: none    Fall Risk Assessment (yolanda all that apply with an X): Completed upon initial eval 4/13/18: not a fall risk.     Wound Characteristics                                                    Location:   RLQ abdomen Initial Evaluation  Date: 04/13/18 Encounter Date:  05/25/2018   Tissue Type and %: 40% adipose, 50% moist red, 10% soft purple/black(resolving hematoma?) Scattered tiny scabs   Periwound: intact intact   Drainage: Min to mod ss  None   Exposed structures adipose none   Wound Edges:   Open Closed   Odor: None none   S&S of Infection:   None none   Edema: None none   Sensation: intact intact               Measurements:   RLQ abdomen Initial Evaluation  Date: 04/13/18 Encounter Date:  05/25/2018  Two openings measured as one   Length (cm) 2 Scabbed   Width (cm) 11    Depth (cm) 3.7    Area (cm2) 22 cm2    Tract/undermine Tract at 3 o'clock to 2cm.                    Procedures:    Debridement: Nonselective debridement with NS and gauze to remove dried flaky skin   Cleansed with: NS                                                                        Periwound protected with:    Primary dressing:    Secondary Dressing: Adhesive foam cut in half   Other:      Patient Education: POC and wound progress discussed with pt. Wound with small scattered scabs. Discussed appointments 1x/w. Instructed pt to leave dressing on for few more days and continue gentle care on wound site.    Professional Collaboration: none today      Assessment:      Wound etiology: open surgical wound (left open. Did not dehisce.)    Wound Progress: Scabbed    Rationale for Treatment:  Adhesive foam to pad and protect.    Patient tolerance/compliance: Pt agreeable with poc    Complicating factors:none    Need for ongoing Advanced Wound Care services: continued skilled wound care for debridement as needed, dressing management and skilled clinical observation to prevent complications and expedite  healing.    Plan:      Treatment Plan and Recommendations:  Diagnosis/ICD10: K35.3    Procedures/CPT: nonselective debridement 71162    Frequency: 1x/week for 30 minute appts.    Treatment Goals: STG 2 Weeks  LTG 4 Weeks   Granulation Tissue: resolved Resolved   Decrease Necrotic Tissue to: 0% 0%   Wound Phase:  maturation maturation   Decrease Size by: 100%    Periwound:  Intact intact   Decrease tracts/undermining by: n/a n/a   Decrease Pain:  none none       At the time of each visit a thorough assessment of the patient is completed to assure the  appropriateness of our plan of care.  The dressings or modalities may need to be adapted   from the original plan to address any significant changes in the wound environment.      Clinician Signature:_______________________________Date__________________      Physician Signature:______________________________Date:__________________

## 2018-06-11 ENCOUNTER — NON-PROVIDER VISIT (OUTPATIENT)
Dept: WOUND CARE | Facility: MEDICAL CENTER | Age: 53
End: 2018-06-11
Attending: SURGERY
Payer: COMMERCIAL

## 2018-06-11 PROCEDURE — 99211 OFF/OP EST MAY X REQ PHY/QHP: CPT

## 2018-06-11 NOTE — CERTIFICATION
Advanced Wound Care   [facility]   [address]   [address]   [place] [address]   [phone] Fax: [phone]     Discharge Note        Referring Physician:  Prashant Chase MD  Wound Etiology: Surgical wound  Wound location: RLQ abdomen  ICD-9: K35.3  Date of Discharge: 06/11/2018     Assessment:  Discharge patient at this time secondary to wound resolution. Educated pt on maturation process of wound healing, continue moisturize daily, and to avoid direct sunlight or apply sunscreen if exposed to sun. Pt verbalizes understanding to all.    Thank you for the referral and the opportunity to treat your patient.        Clinician Signature: _____________________________ Date:_______________

## 2018-12-05 ENCOUNTER — OFFICE VISIT (OUTPATIENT)
Dept: CARDIOLOGY | Facility: MEDICAL CENTER | Age: 53
End: 2018-12-05
Payer: COMMERCIAL

## 2018-12-05 VITALS
HEART RATE: 52 BPM | BODY MASS INDEX: 31.44 KG/M2 | SYSTOLIC BLOOD PRESSURE: 110 MMHG | DIASTOLIC BLOOD PRESSURE: 80 MMHG | HEIGHT: 74 IN | WEIGHT: 245 LBS

## 2018-12-05 DIAGNOSIS — E78.5 DYSLIPIDEMIA: ICD-10-CM

## 2018-12-05 DIAGNOSIS — I10 ESSENTIAL HYPERTENSION: ICD-10-CM

## 2018-12-05 DIAGNOSIS — Z86.79 HISTORY OF ATRIAL FLUTTER: ICD-10-CM

## 2018-12-05 PROCEDURE — 99214 OFFICE O/P EST MOD 30 MIN: CPT | Performed by: INTERNAL MEDICINE

## 2018-12-05 ASSESSMENT — ENCOUNTER SYMPTOMS
BACK PAIN: 0
MYALGIAS: 0
NAUSEA: 0
VOMITING: 0
HEADACHES: 0
HEMOPTYSIS: 0
DIZZINESS: 0
FOCAL WEAKNESS: 0
SHORTNESS OF BREATH: 0
WEIGHT LOSS: 0
SENSORY CHANGE: 0
BLURRED VISION: 0
DOUBLE VISION: 0
PALPITATIONS: 0
BLOOD IN STOOL: 0
HALLUCINATIONS: 0
ORTHOPNEA: 0

## 2018-12-05 NOTE — LETTER
[facility]   [address]  [place] [address]  Phone: [phone]  Fax: [phone]              Jesse Norwood  1965    Encounter Date: 12/5/2018    Tina Nunez M.D.          PROGRESS NOTE:  Chief Complaint   Patient presents with   • Hypertension     follow up   Past atrial flutter in March of this year (first documented episode)    Subjective:   Jesse Norwood is a 53 y.o. male who presents today for follow-up both issues.    He has been on amiodarone for slightly over 6 months now. He denies any palpitation.  He stated that carvedilol has been controlling his blood pressure well and even better than Diovan that he used to take for his blood pressure. He thinks his blood pressure now mostly runs in the 1 2130 mm systolic.  He is somewhat concerned of the fact that his heart rate did not seem to get up to maximum heart rate with exercise.  He however has been exercising more regularly than he used to.  His mother has atrial fibrillation and stroke but not into her 80s.    Past Medical History:   Diagnosis Date   • Allergy    • ASTHMA    • GERD (gastroesophageal reflux disease)     no ulcer/ EGD-    • Hyperlipidemia    • Hypertension      Past Surgical History:   Procedure Laterality Date   • APPENDECTOMY  4/6/2018    Procedure: OPEN APPENDECTOMY;  Surgeon: Prashant Chase M.D.;  Location: SURGERY [facility];  Service: General   • OTHER ORTHOPEDIC SURGERY      achilles     History reviewed. No pertinent family history.  Social History     Social History   • Marital status: Single     Spouse name: N/A   • Number of children: N/A   • Years of education: N/A     Occupational History   • Not on file.     Social History Main Topics   • Smoking status: Never Smoker   • Smokeless tobacco: Never Used   • Alcohol use Yes      Comment: occ   • Drug use: No   • Sexual activity: Not on file     Other Topics Concern   • Not on file     Social  History Narrative   • No narrative on file     Allergies   Allergen Reactions   • Nkda [No Known Drug Allergy]      Outpatient Encounter Prescriptions as of 12/5/2018   Medication Sig Dispense Refill   • aspirin EC (ECOTRIN) 81 MG Tablet Delayed Response Take 81 mg by mouth every day.     • carvedilol (COREG) 6.25 MG Tab Take 1 Tab by mouth 2 times a day, with meals. 60 Tab 5   • omeprazole (PRILOSEC) 20 MG delayed-release capsule Take 20 mg by mouth every day.     • loratadine (CLARITIN) 10 MG Tab Take 10 mg by mouth every day.     • [DISCONTINUED] amiodarone (CORDARONE) 200 MG Tab Take 1 Tab by mouth every day. (Patient taking differently: Take 100 mg by mouth every day.) 30 Tab 3   • zolpidem (AMBIEN) 5 MG Tab Take 5 mg by mouth at bedtime as needed for Sleep.     • potassium chloride SA (KDUR) 20 MEQ Tab CR Take 1 Tab by mouth every day. (Patient not taking: Reported on 5/22/2018) 30 Tab 0   • amoxicillin-clavulanate (AUGMENTIN) 875-125 MG Tab Take 1 Tab by mouth 2 times a day. (Patient not taking: Reported on 5/22/2018) 6 Tab 0   • valsartan (DIOVAN) 160 MG Tab Take 160 mg by mouth every day.     • aspirin EC (ECOTRIN) 325 MG Tablet Delayed Response Take 325 mg by mouth every day.       No facility-administered encounter medications on file as of 12/5/2018.      Review of Systems   Constitutional: Negative for malaise/fatigue and weight loss.   HENT: Negative for nosebleeds.    Eyes: Negative for blurred vision and double vision.   Respiratory: Negative for hemoptysis and shortness of breath.    Cardiovascular: Negative for chest pain, palpitations, orthopnea and leg swelling.   Gastrointestinal: Negative for blood in stool, nausea and vomiting.   Genitourinary: Negative for hematuria.   Musculoskeletal: Negative for back pain and myalgias.   Neurological: Negative for dizziness, sensory change, focal weakness and headaches.   Psychiatric/Behavioral: Negative for hallucinations.        Objective:   /80  "(BP Location: Left arm)   Pulse (!) 52   Ht 1.88 m (6' 2\")   Wt 111.1 kg (245 lb)   BMI 31.46 kg/m²      Physical Exam   Constitutional: He is oriented to person, place, and time. No distress.   HENT:   Head: Normocephalic and atraumatic.   Eyes: EOM are normal.   Neck: No JVD present. No thyromegaly present.   Cardiovascular: Normal rate, regular rhythm and normal heart sounds.  Exam reveals no gallop.    No murmur heard.  Pulmonary/Chest: Effort normal and breath sounds normal. He has no wheezes. He has no rales.   Abdominal: Soft. There is no tenderness.   Musculoskeletal: He exhibits no edema or deformity.   Neurological: He is alert and oriented to person, place, and time.   Skin: Skin is warm. No erythema.   Psychiatric: He has a normal mood and affect. His behavior is normal.     Labs from November 21 CMP was normal except glucose 101   TSH and free T4 were normal.    Lipid profile showed LDL of 131 HDL 41 total cholesterol 197    Assessment:     1. Essential hypertension     2. History of atrial flutter  Holter Monitor Study   3. Dyslipidemia         Medical Decision Making:  Today's Assessment / Status / Plan:     I had a lengthy discussion with him.  There is no clear indication of recurrent atrial flutter.  We decided to discontinue amiodarone at this time.  However since it was not clear whether he was able to proceed with atrial flutter or not in March.  He is also somewhat concerned about possibility of having recurrent atrial flutter without amiodarone, we decided to obtain 24-hour Holter monitoring in a couple weeks.  I informed him that carvedilol certainly could be partly responsible for his heart rate response to exercise but some of this could be from training effect.  We also decided to continue his carvedilol for now but certainly if he prefers to switch back to Diovan in the future, we could do so at any time in the future.  I agree with his primary care provider that he should try to " lower his LDL cholesterol to under 100 mg/dL.  We will see him back for follow-up in about 6 months or so.  We will be happy to see him sooner as needed.  Thank you for allowing us to participate in the care of this patient.        No Recipients

## 2018-12-05 NOTE — PROGRESS NOTES
Chief Complaint   Patient presents with   • Hypertension     follow up   Past atrial flutter in March of this year (first documented episode)    Subjective:   Jesse Norwood is a 53 y.o. male who presents today for follow-up both issues.    He has been on amiodarone for slightly over 6 months now. He denies any palpitation.  He stated that carvedilol has been controlling his blood pressure well and even better than Diovan that he used to take for his blood pressure. He thinks his blood pressure now mostly runs in the 1 2130 mm systolic.  He is somewhat concerned of the fact that his heart rate did not seem to get up to maximum heart rate with exercise.  He however has been exercising more regularly than he used to.  His mother has atrial fibrillation and stroke but not into her 80s.    Past Medical History:   Diagnosis Date   • Allergy    • ASTHMA    • GERD (gastroesophageal reflux disease)     no ulcer/ EGD-    • Hyperlipidemia    • Hypertension      Past Surgical History:   Procedure Laterality Date   • APPENDECTOMY  4/6/2018    Procedure: OPEN APPENDECTOMY;  Surgeon: Prashant Chase M.D.;  Location: SURGERY [facility];  Service: General   • OTHER ORTHOPEDIC SURGERY      achilles     History reviewed. No pertinent family history.  Social History     Social History   • Marital status: Single     Spouse name: N/A   • Number of children: N/A   • Years of education: N/A     Occupational History   • Not on file.     Social History Main Topics   • Smoking status: Never Smoker   • Smokeless tobacco: Never Used   • Alcohol use Yes      Comment: occ   • Drug use: No   • Sexual activity: Not on file     Other Topics Concern   • Not on file     Social History Narrative   • No narrative on file     Allergies   Allergen Reactions   • Nkda [No Known Drug Allergy]      Outpatient Encounter Prescriptions as of 12/5/2018   Medication Sig Dispense Refill   • aspirin EC (ECOTRIN) 81 MG Tablet Delayed Response Take 81 mg by  "mouth every day.     • carvedilol (COREG) 6.25 MG Tab Take 1 Tab by mouth 2 times a day, with meals. 60 Tab 5   • omeprazole (PRILOSEC) 20 MG delayed-release capsule Take 20 mg by mouth every day.     • loratadine (CLARITIN) 10 MG Tab Take 10 mg by mouth every day.     • [DISCONTINUED] amiodarone (CORDARONE) 200 MG Tab Take 1 Tab by mouth every day. (Patient taking differently: Take 100 mg by mouth every day.) 30 Tab 3   • zolpidem (AMBIEN) 5 MG Tab Take 5 mg by mouth at bedtime as needed for Sleep.     • potassium chloride SA (KDUR) 20 MEQ Tab CR Take 1 Tab by mouth every day. (Patient not taking: Reported on 5/22/2018) 30 Tab 0   • amoxicillin-clavulanate (AUGMENTIN) 875-125 MG Tab Take 1 Tab by mouth 2 times a day. (Patient not taking: Reported on 5/22/2018) 6 Tab 0   • valsartan (DIOVAN) 160 MG Tab Take 160 mg by mouth every day.     • aspirin EC (ECOTRIN) 325 MG Tablet Delayed Response Take 325 mg by mouth every day.       No facility-administered encounter medications on file as of 12/5/2018.      Review of Systems   Constitutional: Negative for malaise/fatigue and weight loss.   HENT: Negative for nosebleeds.    Eyes: Negative for blurred vision and double vision.   Respiratory: Negative for hemoptysis and shortness of breath.    Cardiovascular: Negative for chest pain, palpitations, orthopnea and leg swelling.   Gastrointestinal: Negative for blood in stool, nausea and vomiting.   Genitourinary: Negative for hematuria.   Musculoskeletal: Negative for back pain and myalgias.   Neurological: Negative for dizziness, sensory change, focal weakness and headaches.   Psychiatric/Behavioral: Negative for hallucinations.        Objective:   /80 (BP Location: Left arm)   Pulse (!) 52   Ht 1.88 m (6' 2\")   Wt 111.1 kg (245 lb)   BMI 31.46 kg/m²     Physical Exam   Constitutional: He is oriented to person, place, and time. No distress.   HENT:   Head: Normocephalic and atraumatic.   Eyes: EOM are normal. "   Neck: No JVD present. No thyromegaly present.   Cardiovascular: Normal rate, regular rhythm and normal heart sounds.  Exam reveals no gallop.    No murmur heard.  Pulmonary/Chest: Effort normal and breath sounds normal. He has no wheezes. He has no rales.   Abdominal: Soft. There is no tenderness.   Musculoskeletal: He exhibits no edema or deformity.   Neurological: He is alert and oriented to person, place, and time.   Skin: Skin is warm. No erythema.   Psychiatric: He has a normal mood and affect. His behavior is normal.     Labs from November 21 CMP was normal except glucose 101   TSH and free T4 were normal.    Lipid profile showed LDL of 131 HDL 41 total cholesterol 197    Assessment:     1. Essential hypertension     2. History of atrial flutter  Holter Monitor Study   3. Dyslipidemia         Medical Decision Making:  Today's Assessment / Status / Plan:     I had a lengthy discussion with him.  There is no clear indication of recurrent atrial flutter.  We decided to discontinue amiodarone at this time.  However since it was not clear whether he was able to proceed with atrial flutter or not in March.  He is also somewhat concerned about possibility of having recurrent atrial flutter without amiodarone, we decided to obtain 24-hour Holter monitoring in a couple weeks.  I informed him that carvedilol certainly could be partly responsible for his heart rate response to exercise but some of this could be from training effect.  We also decided to continue his carvedilol for now but certainly if he prefers to switch back to Diovan in the future, we could do so at any time in the future.  I agree with his primary care provider that he should try to lower his LDL cholesterol to under 100 mg/dL.  We will see him back for follow-up in about 6 months or so.  We will be happy to see him sooner as needed.  Thank you for allowing us to participate in the care of this patient.

## 2018-12-12 DIAGNOSIS — I10 ESSENTIAL HYPERTENSION: Primary | ICD-10-CM

## 2018-12-12 RX ORDER — CARVEDILOL 6.25 MG/1
TABLET ORAL
Qty: 180 TAB | Refills: 3 | Status: SHIPPED | OUTPATIENT
Start: 2018-12-12

## 2018-12-19 ENCOUNTER — NON-PROVIDER VISIT (OUTPATIENT)
Dept: CARDIOLOGY | Facility: MEDICAL CENTER | Age: 53
End: 2018-12-19
Payer: COMMERCIAL

## 2018-12-19 DIAGNOSIS — R00.1 BRADYCARDIA: ICD-10-CM

## 2018-12-19 PROCEDURE — 93224 XTRNL ECG REC UP TO 48 HRS: CPT | Performed by: INTERNAL MEDICINE

## 2018-12-21 DIAGNOSIS — Z86.79 HISTORY OF ATRIAL FLUTTER: ICD-10-CM

## 2018-12-21 LAB — EKG IMPRESSION: NORMAL

## 2018-12-27 ENCOUNTER — TELEPHONE (OUTPATIENT)
Dept: CARDIOLOGY | Facility: MEDICAL CENTER | Age: 53
End: 2018-12-27

## 2018-12-27 NOTE — LETTER
December 27, 2018        Jesse Norwood  [address]  [address]  [place] [address]          Dear Jesse,    We have received the results of your recent: Holter Monitor Study.    Your test came back unchanged or within normal limits.  Continue your current     medications and let us know if you develop any palpitations.  Please follow up as     previously discussed with your physician.      Feel free to call us with any questions.        Sincerely,          Pinky Nunez  Electronically Signed

## 2018-12-27 NOTE — TELEPHONE ENCOUNTER
Holter Monitor Study   Order: [id number]   Status:  Final result   Visible to patient:  Yes (MyChart)  Dx:  History of atrial flutter  HELLEN Agudelo R.N.          Holter did not show any atrial flutter   Cont current med   To lets us know if develop any palpitation         Attempted to call patient, unable to reach patient through house and mobile number.  Attempted to call patient through work number and spoke with .  She states that he is unavailable for the holidays.      Letter printed with MD recommendations.  Letter sent to patient's mailing address with MD recommendations.

## 2021-05-20 ENCOUNTER — APPOINTMENT (RX ONLY)
Dept: URBAN - METROPOLITAN AREA CLINIC 31 | Facility: CLINIC | Age: 56
Setting detail: DERMATOLOGY
End: 2021-05-20

## 2021-05-20 DIAGNOSIS — B35.1 TINEA UNGUIUM: ICD-10-CM

## 2021-05-20 DIAGNOSIS — D22 MELANOCYTIC NEVI: ICD-10-CM

## 2021-05-20 DIAGNOSIS — L82.1 OTHER SEBORRHEIC KERATOSIS: ICD-10-CM

## 2021-05-20 DIAGNOSIS — B35.3 TINEA PEDIS: ICD-10-CM

## 2021-05-20 DIAGNOSIS — L50.1 IDIOPATHIC URTICARIA: ICD-10-CM | Status: INADEQUATELY CONTROLLED

## 2021-05-20 PROBLEM — D22.62 MELANOCYTIC NEVI OF LEFT UPPER LIMB, INCLUDING SHOULDER: Status: ACTIVE | Noted: 2021-05-20

## 2021-05-20 PROBLEM — L30.9 DERMATITIS, UNSPECIFIED: Status: ACTIVE | Noted: 2021-05-20

## 2021-05-20 PROCEDURE — ? MEDICATION COUNSELING

## 2021-05-20 PROCEDURE — 99204 OFFICE O/P NEW MOD 45 MIN: CPT

## 2021-05-20 PROCEDURE — ? COUNSELING

## 2021-05-20 PROCEDURE — ? PRESCRIPTION

## 2021-05-20 PROCEDURE — ? ADDITIONAL NOTES

## 2021-05-20 RX ORDER — TRIAMCINOLONE ACETONIDE 1 MG/G
1 CREAM TOPICAL BID
Qty: 1 | Refills: 1 | Status: ERX | COMMUNITY
Start: 2021-05-20

## 2021-05-20 RX ORDER — KETOCONAZOLE 20 MG/G
1 CREAM TOPICAL BID
Qty: 1 | Refills: 2 | Status: ERX | COMMUNITY
Start: 2021-05-20

## 2021-05-20 RX ORDER — CICLOPIROX 80 MG/ML
SOLUTION TOPICAL QD
Qty: 1 | Refills: 11 | Status: ERX | COMMUNITY
Start: 2021-05-20

## 2021-05-20 RX ADMIN — KETOCONAZOLE 1: 20 CREAM TOPICAL at 00:00

## 2021-05-20 RX ADMIN — TRIAMCINOLONE ACETONIDE 1: 1 CREAM TOPICAL at 00:00

## 2021-05-20 RX ADMIN — CICLOPIROX: 80 SOLUTION TOPICAL at 00:00

## 2021-05-20 ASSESSMENT — LOCATION SIMPLE DESCRIPTION DERM
LOCATION SIMPLE: LEFT FOREARM
LOCATION SIMPLE: RIGHT FOREARM
LOCATION SIMPLE: LEFT GREAT TOE
LOCATION SIMPLE: RIGHT 2ND TOE
LOCATION SIMPLE: RIGHT CHEEK
LOCATION SIMPLE: RIGHT GREAT TOE
LOCATION SIMPLE: LEFT PLANTAR SURFACE
LOCATION SIMPLE: LEFT UPPER ARM
LOCATION SIMPLE: LEFT FOOT
LOCATION SIMPLE: RIGHT PLANTAR SURFACE

## 2021-05-20 ASSESSMENT — LOCATION ZONE DERM
LOCATION ZONE: FACE
LOCATION ZONE: FEET
LOCATION ZONE: TOENAIL
LOCATION ZONE: TOE
LOCATION ZONE: ARM

## 2021-05-20 ASSESSMENT — LOCATION DETAILED DESCRIPTION DERM
LOCATION DETAILED: LEFT DISTAL DORSAL FOREARM
LOCATION DETAILED: RIGHT DISTAL PLANTAR GREAT TOE
LOCATION DETAILED: LEFT GREAT TOENAIL
LOCATION DETAILED: RIGHT VENTRAL DISTAL FOREARM
LOCATION DETAILED: RIGHT MEDIAL 2ND TOE
LOCATION DETAILED: LEFT ANTERIOR DISTAL UPPER ARM
LOCATION DETAILED: RIGHT PLANTAR FOREFOOT OVERLYING 2ND METATARSAL
LOCATION DETAILED: LEFT PLANTAR FOREFOOT OVERLYING 3RD METATARSAL
LOCATION DETAILED: RIGHT CENTRAL MALAR CHEEK
LOCATION DETAILED: 1ST WEBSPACE LEFT FOOT

## 2021-05-20 NOTE — PROCEDURE: MEDICATION COUNSELING
Simponi Counseling:  I discussed with the patient the risks of golimumab including but not limited to myelosuppression, immunosuppression, autoimmune hepatitis, demyelinating diseases, lymphoma, and serious infections.  The patient understands that monitoring is required including a PPD at baseline and must alert us or the primary physician if symptoms of infection or other concerning signs are noted.
High Dose Vitamin A Pregnancy And Lactation Text: High dose vitamin A therapy is contraindicated during pregnancy and breast feeding.
Hydroquinone Counseling:  Patient advised that medication may result in skin irritation, lightening (hypopigmentation), dryness, and burning.  In the event of skin irritation, the patient was advised to reduce the amount of the drug applied or use it less frequently.  Rarely, spots that are treated with hydroquinone can become darker (pseudoochronosis).  Should this occur, patient instructed to stop medication and call the office. The patient verbalized understanding of the proper use and possible adverse effects of hydroquinone.  All of the patient's questions and concerns were addressed.
Bactrim Pregnancy And Lactation Text: This medication is Pregnancy Category D and is known to cause fetal risk.  It is also excreted in breast milk.
Sarecycline Counseling: Patient advised regarding possible photosensitivity and discoloration of the teeth, skin, lips, tongue and gums.  Patient instructed to avoid sunlight, if possible.  When exposed to sunlight, patients should wear protective clothing, sunglasses, and sunscreen.  The patient was instructed to call the office immediately if the following severe adverse effects occur:  hearing changes, easy bruising/bleeding, severe headache, or vision changes.  The patient verbalized understanding of the proper use and possible adverse effects of sarecycline.  All of the patient's questions and concerns were addressed.
Colchicine Counseling:  Patient counseled regarding adverse effects including but not limited to stomach upset (nausea, vomiting, stomach pain, or diarrhea).  Patient instructed to limit alcohol consumption while taking this medication.  Colchicine may reduce blood counts especially with prolonged use.  The patient understands that monitoring of kidney function and blood counts may be required, especially at baseline. The patient verbalized understanding of the proper use and possible adverse effects of colchicine.  All of the patient's questions and concerns were addressed.
Cellcept Pregnancy And Lactation Text: This medication is Pregnancy Category D and isn't considered safe during pregnancy. It is unknown if this medication is excreted in breast milk.
Hydroquinone Pregnancy And Lactation Text: This medication has not been assigned a Pregnancy Risk Category but animal studies failed to show danger with the topical medication. It is unknown if the medication is excreted in breast milk.
Tazorac Counseling:  Patient advised that medication is irritating and drying.  Patient may need to apply sparingly and wash off after an hour before eventually leaving it on overnight.  The patient verbalized understanding of the proper use and possible adverse effects of tazorac.  All of the patient's questions and concerns were addressed.
Otezla Pregnancy And Lactation Text: This medication is Pregnancy Category C and it isn't known if it is safe during pregnancy. It is unknown if it is excreted in breast milk.
Tazorac Pregnancy And Lactation Text: This medication is not safe during pregnancy. It is unknown if this medication is excreted in breast milk.
Cimzia Pregnancy And Lactation Text: This medication crosses the placenta but can be considered safe in certain situations. Cimzia may be excreted in breast milk.
Colchicine Pregnancy And Lactation Text: This medication is Pregnancy Category C and isn't considered safe during pregnancy. It is excreted in breast milk.
Sarecycline Pregnancy And Lactation Text: This medication is Pregnancy Category D and not consider safe during pregnancy. It is also excreted in breast milk.
Oxybutynin Counseling:  I discussed with the patient the risks of oxybutynin including but not limited to skin rash, drowsiness, dry mouth, difficulty urinating, and blurred vision.
Cephalexin Counseling: I counseled the patient regarding use of cephalexin as an antibiotic for prophylactic and/or therapeutic purposes. Cephalexin (commonly prescribed under brand name Keflex) is a cephalosporin antibiotic which is active against numerous classes of bacteria, including most skin bacteria. Side effects may include nausea, diarrhea, gastrointestinal upset, rash, hives, yeast infections, and in rare cases, hepatitis, kidney disease, seizures, fever, confusion, neurologic symptoms, and others. Patients with severe allergies to penicillin medications are cautioned that there is about a 10% incidence of cross-reactivity with cephalosporins. When possible, patients with penicillin allergies should use alternatives to cephalosporins for antibiotic therapy.
Simponi Pregnancy And Lactation Text: The risk during pregnancy and breastfeeding is uncertain with this medication.
Cyclophosphamide Counseling:  I discussed with the patient the risks of cyclophosphamide including but not limited to hair loss, hormonal abnormalities, decreased fertility, abdominal pain, diarrhea, nausea and vomiting, bone marrow suppression and infection. The patient understands that monitoring is required while taking this medication.
Cosentyx Counseling:  I discussed with the patient the risks of Cosentyx including but not limited to worsening of Crohn's disease, immunosuppression, allergic reactions and infections.  The patient understands that monitoring is required including a PPD at baseline and must alert us or the primary physician if symptoms of infection or other concerning signs are noted.
Imiquimod Counseling:  I discussed with the patient the risks of imiquimod including but not limited to erythema, scaling, itching, weeping, crusting, and pain.  Patient understands that the inflammatory response to imiquimod is variable from person to person and was educated regarded proper titration schedule.  If flu-like symptoms develop, patient knows to discontinue the medication and contact us.
Tetracycline Counseling: Patient counseled regarding possible photosensitivity and increased risk for sunburn.  Patient instructed to avoid sunlight, if possible.  When exposed to sunlight, patients should wear protective clothing, sunglasses, and sunscreen.  The patient was instructed to call the office immediately if the following severe adverse effects occur:  hearing changes, easy bruising/bleeding, severe headache, or vision changes.  The patient verbalized understanding of the proper use and possible adverse effects of tetracycline.  All of the patient's questions and concerns were addressed. Patient understands to avoid pregnancy while on therapy due to potential birth defects.
Skyrizi Counseling: I discussed with the patient the risks of risankizumab-rzaa including but not limited to immunosuppression, and serious infections.  The patient understands that monitoring is required including a PPD at baseline and must alert us or the primary physician if symptoms of infection or other concerning signs are noted.
Oxybutynin Pregnancy And Lactation Text: This medication is Pregnancy Category B and is considered safe during pregnancy. It is unknown if it is excreted in breast milk.
Topical Clindamycin Counseling: Patient counseled that this medication may cause skin irritation or allergic reactions.  In the event of skin irritation, the patient was advised to reduce the amount of the drug applied or use it less frequently.   The patient verbalized understanding of the proper use and possible adverse effects of clindamycin.  All of the patient's questions and concerns were addressed.
Dapsone Counseling: I discussed with the patient the risks of dapsone including but not limited to hemolytic anemia, agranulocytosis, rashes, methemoglobinemia, kidney failure, peripheral neuropathy, headaches, GI upset, and liver toxicity.  Patients who start dapsone require monitoring including baseline LFTs and weekly CBCs for the first month, then every month thereafter.  The patient verbalized understanding of the proper use and possible adverse effects of dapsone.  All of the patient's questions and concerns were addressed.
Cyclophosphamide Pregnancy And Lactation Text: This medication is Pregnancy Category D and it isn't considered safe during pregnancy. This medication is excreted in breast milk.
Cosentyx Pregnancy And Lactation Text: This medication is Pregnancy Category B and is considered safe during pregnancy. It is unknown if this medication is excreted in breast milk.
Cephalexin Pregnancy And Lactation Text: This medication is Pregnancy Category B and considered safe during pregnancy.  It is also excreted in breast milk but can be used safely for shorter doses.
Imiquimod Pregnancy And Lactation Text: This medication is Pregnancy Category C. It is unknown if this medication is excreted in breast milk.
Benzoyl Peroxide Counseling: Patient counseled that medicine may cause skin irritation and bleach clothing.  In the event of skin irritation, the patient was advised to reduce the amount of the drug applied or use it less frequently.   The patient verbalized understanding of the proper use and possible adverse effects of benzoyl peroxide.  All of the patient's questions and concerns were addressed.
Dapsone Pregnancy And Lactation Text: This medication is Pregnancy Category C and is not considered safe during pregnancy or breast feeding.
Clindamycin Counseling: I counseled the patient regarding use of clindamycin as an antibiotic for prophylactic and/or therapeutic purposes. Clindamycin is active against numerous classes of bacteria, including skin bacteria. Side effects may include nausea, diarrhea, gastrointestinal upset, rash, hives, yeast infections, and in rare cases, colitis.
Propranolol Counseling:  I discussed with the patient the risks of propranolol including but not limited to low heart rate, low blood pressure, low blood sugar, restlessness and increased cold sensitivity. They should call the office if they experience any of these side effects.
Topical Sulfur Applications Counseling: Topical Sulfur Counseling: Patient counseled that this medication may cause skin irritation or allergic reactions.  In the event of skin irritation, the patient was advised to reduce the amount of the drug applied or use it less frequently.   The patient verbalized understanding of the proper use and possible adverse effects of topical sulfur application.  All of the patient's questions and concerns were addressed.
Erivedge Counseling- I discussed with the patient the risks of Erivedge including but not limited to nausea, vomiting, diarrhea, constipation, weight loss, changes in the sense of taste, decreased appetite, muscle spasms, and hair loss.  The patient verbalized understanding of the proper use and possible adverse effects of Erivedge.  All of the patient's questions and concerns were addressed.
Dupixent Counseling: I discussed with the patient the risks of dupilumab including but not limited to eye infection and irritation, cold sores, injection site reactions, worsening of asthma, allergic reactions and increased risk of parasitic infection.  Live vaccines should be avoided while taking dupilumab. Dupilumab will also interact with certain medications such as warfarin and cyclosporine. The patient understands that monitoring is required and they must alert us or the primary physician if symptoms of infection or other concerning signs are noted.
Stelara Counseling:  I discussed with the patient the risks of ustekinumab including but not limited to immunosuppression, malignancy, posterior leukoencephalopathy syndrome, and serious infections.  The patient understands that monitoring is required including a PPD at baseline and must alert us or the primary physician if symptoms of infection or other concerning signs are noted.
Cyclosporine Counseling:  I discussed with the patient the risks of cyclosporine including but not limited to hypertension, gingival hyperplasia,myelosuppression, immunosuppression, liver damage, kidney damage, neurotoxicity, lymphoma, and serious infections. The patient understands that monitoring is required including baseline blood pressure, CBC, CMP, lipid panel and uric acid, and then 1-2 times monthly CMP and blood pressure.
Minoxidil Counseling: Minoxidil is a topical medication which can increase blood flow where it is applied. It is uncertain how this medication increases hair growth. Side effects are uncommon and include stinging and allergic reactions.
Clindamycin Pregnancy And Lactation Text: This medication can be used in pregnancy if certain situations. Clindamycin is also present in breast milk.
Cyclosporine Pregnancy And Lactation Text: This medication is Pregnancy Category C and it isn't know if it is safe during pregnancy. This medication is excreted in breast milk.
Propranolol Pregnancy And Lactation Text: This medication is Pregnancy Category C and it isn't known if it is safe during pregnancy. It is excreted in breast milk.
Benzoyl Peroxide Pregnancy And Lactation Text: This medication is Pregnancy Category C. It is unknown if benzoyl peroxide is excreted in breast milk.
Cimetidine Pregnancy And Lactation Text: This medication is Pregnancy Category B and is considered safe during pregnancy. It is also excreted in breast milk and breast feeding isn't recommended.
Dupixent Pregnancy And Lactation Text: This medication likely crosses the placenta but the risk for the fetus is uncertain. This medication is excreted in breast milk.
Carac Counseling:  I discussed with the patient the risks of Carac including but not limited to erythema, scaling, itching, weeping, crusting, and pain.
Doxycycline Counseling:  Patient counseled regarding possible photosensitivity and increased risk for sunburn.  Patient instructed to avoid sunlight, if possible.  When exposed to sunlight, patients should wear protective clothing, sunglasses, and sunscreen.  The patient was instructed to call the office immediately if the following severe adverse effects occur:  hearing changes, easy bruising/bleeding, severe headache, or vision changes.  The patient verbalized understanding of the proper use and possible adverse effects of doxycycline.  All of the patient's questions and concerns were addressed.
Birth Control Pills Counseling: Birth Control Pill Counseling: I discussed with the patient the potential side effects of OCPs including but not limited to increased risk of stroke, heart attack, thrombophlebitis, deep venous thrombosis, hepatic adenomas, breast changes, GI upset, headaches, and depression.  The patient verbalized understanding of the proper use and possible adverse effects of OCPs. All of the patient's questions and concerns were addressed.
Topical Sulfur Applications Pregnancy And Lactation Text: This medication is Pregnancy Category C and has an unknown safety profile during pregnancy. It is unknown if this topical medication is excreted in breast milk.
Erivedge Pregnancy And Lactation Text: This medication is Pregnancy Category X and is absolutely contraindicated during pregnancy. It is unknown if it is excreted in breast milk.
Methotrexate Counseling:  Patient counseled regarding adverse effects of methotrexate including but not limited to nausea, vomiting, abnormalities in liver function tests. Patients may develop mouth sores, rash, diarrhea, and abnormalities in blood counts. The patient understands that monitoring is required including LFT's and blood counts.  There is a rare possibility of scarring of the liver and lung problems that can occur when taking methotrexate. Persistent nausea, loss of appetite, pale stools, dark urine, cough, and shortness of breath should be reported immediately. Patient advised to discontinue methotrexate treatment at least three months before attempting to become pregnant.  I discussed the need for folate supplements while taking methotrexate.  These supplements can decrease side effects during methotrexate treatment. The patient verbalized understanding of the proper use and possible adverse effects of methotrexate.  All of the patient's questions and concerns were addressed.
Doxepin Counseling:  Patient advised that the medication is sedating and not to drive a car after taking this medication. Patient informed of potential adverse effects including but not limited to dry mouth, urinary retention, and blurry vision.  The patient verbalized understanding of the proper use and possible adverse effects of doxepin.  All of the patient's questions and concerns were addressed.
Mirvaso Counseling: Mirvaso is a topical medication which can decrease superficial blood flow where applied. Side effects are uncommon and include stinging, redness and allergic reactions.
Fluconazole Counseling:  Patient counseled regarding adverse effects of fluconazole including but not limited to headache, diarrhea, nausea, upset stomach, liver function test abnormalities, taste disturbance, and stomach pain.  There is a rare possibility of liver failure that can occur when taking fluconazole.  The patient understands that monitoring of LFTs and kidney function test may be required, especially at baseline. The patient verbalized understanding of the proper use and possible adverse effects of fluconazole.  All of the patient's questions and concerns were addressed.
Wartpeel Counseling:  I discussed with the patient the risks of Wartpeel including but not limited to erythema, scaling, itching, weeping, crusting, and pain.
Glycopyrrolate Counseling:  I discussed with the patient the risks of glycopyrrolate including but not limited to skin rash, drowsiness, dry mouth, difficulty urinating, and blurred vision.
Carac Pregnancy And Lactation Text: This medication is Pregnancy Category X and contraindicated in pregnancy and in women who may become pregnant. It is unknown if this medication is excreted in breast milk.
Enbrel Counseling:  I discussed with the patient the risks of etanercept including but not limited to myelosuppression, immunosuppression, autoimmune hepatitis, demyelinating diseases, lymphoma, and infections.  The patient understands that monitoring is required including a PPD at baseline and must alert us or the primary physician if symptoms of infection or other concerning signs are noted.
Doxepin Pregnancy And Lactation Text: This medication is Pregnancy Category C and it isn't known if it is safe during pregnancy. It is also excreted in breast milk and breast feeding isn't recommended.
Finasteride Male Counseling: Finasteride Counseling:  I discussed with the patient the risks of use of finasteride including but not limited to decreased libido, decreased ejaculate volume, gynecomastia, and depression. Women should not handle medication.  All of the patient's questions and concerns were addressed.
Fluconazole Pregnancy And Lactation Text: This medication is Pregnancy Category C and it isn't know if it is safe during pregnancy. It is also excreted in breast milk.
Doxycycline Pregnancy And Lactation Text: This medication is Pregnancy Category D and not consider safe during pregnancy. It is also excreted in breast milk but is considered safe for shorter treatment courses.
Birth Control Pills Pregnancy And Lactation Text: This medication should be avoided if pregnant and for the first 30 days post-partum.
Glycopyrrolate Pregnancy And Lactation Text: This medication is Pregnancy Category B and is considered safe during pregnancy. It is unknown if it is excreted breast milk.
Taltz Counseling: I discussed with the patient the risks of ixekizumab including but not limited to immunosuppression, serious infections, worsening of inflammatory bowel disease and drug reactions.  The patient understands that monitoring is required including a PPD at baseline and must alert us or the primary physician if symptoms of infection or other concerning signs are noted.
Methotrexate Pregnancy And Lactation Text: This medication is Pregnancy Category X and is known to cause fetal harm. This medication is excreted in breast milk.
Mirvaso Pregnancy And Lactation Text: This medication has not been assigned a Pregnancy Risk Category. It is unknown if the medication is excreted in breast milk.
Finasteride Pregnancy And Lactation Text: This medication is absolutely contraindicated during pregnancy. It is unknown if it is excreted in breast milk.
Griseofulvin Counseling:  I discussed with the patient the risks of griseofulvin including but not limited to photosensitivity, cytopenia, liver damage, nausea/vomiting and severe allergy.  The patient understands that this medication is best absorbed when taken with a fatty meal (e.g., ice cream or french fries).
Calcipotriene Counseling:  I discussed with the patient the risks of calcipotriene including but not limited to erythema, scaling, itching, and irritation.
Hydroxychloroquine Counseling:  I discussed with the patient that a baseline ophthalmologic exam is needed at the start of therapy and every year thereafter while on therapy. A CBC may also be warranted for monitoring.  The side effects of this medication were discussed with the patient, including but not limited to agranulocytosis, aplastic anemia, seizures, rashes, retinopathy, and liver toxicity. Patient instructed to call the office should any adverse effect occur.  The patient verbalized understanding of the proper use and possible adverse effects of Plaquenil.  All the patient's questions and concerns were addressed.
Spironolactone Counseling: Patient advised regarding risks of diarrhea, abdominal pain, hyperkalemia, birth defects (for female patients), liver toxicity and renal toxicity. The patient may need blood work to monitor liver and kidney function and potassium levels while on therapy. The patient verbalized understanding of the proper use and possible adverse effects of spironolactone.  All of the patient's questions and concerns were addressed.
Humira Counseling:  I discussed with the patient the risks of adalimumab including but not limited to myelosuppression, immunosuppression, autoimmune hepatitis, demyelinating diseases, lymphoma, and serious infections.  The patient understands that monitoring is required including a PPD at baseline and must alert us or the primary physician if symptoms of infection or other concerning signs are noted.
Prednisone Counseling:  I discussed with the patient the risks of prolonged use of prednisone including but not limited to weight gain, insomnia, osteoporosis, mood changes, diabetes, susceptibility to infection, glaucoma and high blood pressure.  In cases where prednisone use is prolonged, patients should be monitored with blood pressure checks, serum glucose levels and an eye exam.  Additionally, the patient may need to be placed on GI prophylaxis, PCP prophylaxis, and calcium and vitamin D supplementation and/or a bisphosphonate.  The patient verbalized understanding of the proper use and the possible adverse effects of prednisone.  All of the patient's questions and concerns were addressed.
Hydroxyzine Counseling: Patient advised that the medication is sedating and not to drive a car after taking this medication.  Patient informed of potential adverse effects including but not limited to dry mouth, urinary retention, and blurry vision.  The patient verbalized understanding of the proper use and possible adverse effects of hydroxyzine.  All of the patient's questions and concerns were addressed.
Picato Counseling:  I discussed with the patient the risks of Picato including but not limited to erythema, scaling, itching, weeping, crusting, and pain.
Erythromycin Counseling:  I discussed with the patient the risks of erythromycin including but not limited to GI upset, allergic reaction, drug rash, diarrhea, increase in liver enzymes, and yeast infections.
Calcipotriene Pregnancy And Lactation Text: This medication has not been proven safe during pregnancy. It is unknown if this medication is excreted in breast milk.
Hydroxychloroquine Pregnancy And Lactation Text: This medication has been shown to cause fetal harm but it isn't assigned a Pregnancy Risk Category. There are small amounts excreted in breast milk.
Hydroxyzine Pregnancy And Lactation Text: This medication is not safe during pregnancy and should not be taken. It is also excreted in breast milk and breast feeding isn't recommended.
Zyclara Counseling:  I discussed with the patient the risks of imiquimod including but not limited to erythema, scaling, itching, weeping, crusting, and pain.  Patient understands that the inflammatory response to imiquimod is variable from person to person and was educated regarded proper titration schedule.  If flu-like symptoms develop, patient knows to discontinue the medication and contact us.
Gabapentin Counseling: I discussed with the patient the risks of gabapentin including but not limited to dizziness, somnolence, fatigue and ataxia.
Griseofulvin Pregnancy And Lactation Text: This medication is Pregnancy Category X and is known to cause serious birth defects. It is unknown if this medication is excreted in breast milk but breast feeding should be avoided.
Erythromycin Pregnancy And Lactation Text: This medication is Pregnancy Category B and is considered safe during pregnancy. It is also excreted in breast milk.
Tremfya Counseling: I discussed with the patient the risks of guselkumab including but not limited to immunosuppression, serious infections, and drug reactions.  The patient understands that monitoring is required including a PPD at baseline and must alert us or the primary physician if symptoms of infection or other concerning signs are noted.
Spironolactone Pregnancy And Lactation Text: This medication can cause feminization of the male fetus and should be avoided during pregnancy. The active metabolite is also found in breast milk.
Detail Level: Generalized
SSKI Counseling:  I discussed with the patient the risks of SSKI including but not limited to thyroid abnormalities, metallic taste, GI upset, fever, headache, acne, arthralgias, paraesthesias, lymphadenopathy, easy bleeding, arrhythmias, and allergic reaction.
Metronidazole Counseling:  I discussed with the patient the risks of metronidazole including but not limited to seizures, nausea/vomiting, a metallic taste in the mouth, nausea/vomiting and severe allergy.
Itraconazole Counseling:  I discussed with the patient the risks of itraconazole including but not limited to liver damage, nausea/vomiting, neuropathy, and severe allergy.  The patient understands that this medication is best absorbed when taken with acidic beverages such as non-diet cola or ginger ale.  The patient understands that monitoring is required including baseline LFTs and repeat LFTs at intervals.  The patient understands that they are to contact us or the primary physician if concerning signs are noted.
Libtayo Counseling- I discussed with the patient the risks of Libtayo including but not limited to nausea, vomiting, diarrhea, and bone or muscle pain.  The patient verbalized understanding of the proper use and possible adverse effects of Libtayo.  All of the patient's questions and concerns were addressed.
5-Fu Counseling: 5-Fluorouracil Counseling:  I discussed with the patient the risks of 5-fluorouracil including but not limited to erythema, scaling, itching, weeping, crusting, and pain.
Xeljanz Counseling: I discussed with the patient the risks of Xeljanz therapy including increased risk of infection, liver issues, headache, diarrhea, or cold symptoms. Live vaccines should be avoided. They were instructed to call if they have any problems.
Protopic Counseling: Patient may experience a mild burning sensation during topical application. Protopic is not approved in children less than 2 years of age. There have been case reports of hematologic and skin malignancies in patients using topical calcineurin inhibitors although causality is questionable.
Sski Pregnancy And Lactation Text: This medication is Pregnancy Category D and isn't considered safe during pregnancy. It is excreted in breast milk.
Ilumya Counseling: I discussed with the patient the risks of tildrakizumab including but not limited to immunosuppression, malignancy, posterior leukoencephalopathy syndrome, and serious infections.  The patient understands that monitoring is required including a PPD at baseline and must alert us or the primary physician if symptoms of infection or other concerning signs are noted.
Acitretin Counseling:  I discussed with the patient the risks of acitretin including but not limited to hair loss, dry lips/skin/eyes, liver damage, hyperlipidemia, depression/suicidal ideation, photosensitivity.  Serious rare side effects can include but are not limited to pancreatitis, pseudotumor cerebri, bony changes, clot formation/stroke/heart attack.  Patient understands that alcohol is contraindicated since it can result in liver toxicity and significantly prolong the elimination of the drug by many years.
Albendazole Counseling:  I discussed with the patient the risks of albendazole including but not limited to cytopenia, kidney damage, nausea/vomiting and severe allergy.  The patient understands that this medication is being used in an off-label manner.
Protopic Pregnancy And Lactation Text: This medication is Pregnancy Category C. It is unknown if this medication is excreted in breast milk when applied topically.
Metronidazole Pregnancy And Lactation Text: This medication is Pregnancy Category B and considered safe during pregnancy.  It is also excreted in breast milk.
Libtayo Pregnancy And Lactation Text: This medication is contraindicated in pregnancy and when breast feeding.
Drysol Counseling:  I discussed with the patient the risks of drysol/aluminum chloride including but not limited to skin rash, itching, irritation, burning.
Albendazole Pregnancy And Lactation Text: This medication is Pregnancy Category C and it isn't known if it is safe during pregnancy. It is also excreted in breast milk.
Opioid Counseling: I discussed with the patient the potential side effects of opioids including but not limited to addiction, altered mental status, and depression. I stressed avoiding alcohol, benzodiazepines, muscle relaxants and sleep aids unless specifically okayed by a physician. The patient verbalized understanding of the proper use and possible adverse effects of opioids. All of the patient's questions and concerns were addressed. They were instructed to flush the remaining pills down the toilet if they did not need them for pain.
Ketoconazole Counseling:   Patient counseled regarding improving absorption with orange juice.  Adverse effects include but are not limited to breast enlargement, headache, diarrhea, nausea, upset stomach, liver function test abnormalities, taste disturbance, and stomach pain.  There is a rare possibility of liver failure that can occur when taking ketoconazole. The patient understands that monitoring of LFTs may be required, especially at baseline. The patient verbalized understanding of the proper use and possible adverse effects of ketoconazole.  All of the patient's questions and concerns were addressed.
Thalidomide Counseling: I discussed with the patient the risks of thalidomide including but not limited to birth defects, anxiety, weakness, chest pain, dizziness, cough and severe allergy.
Include Pregnancy/Lactation Warning?: No
Minocycline Counseling: Patient advised regarding possible photosensitivity and discoloration of the teeth, skin, lips, tongue and gums.  Patient instructed to avoid sunlight, if possible.  When exposed to sunlight, patients should wear protective clothing, sunglasses, and sunscreen.  The patient was instructed to call the office immediately if the following severe adverse effects occur:  hearing changes, easy bruising/bleeding, severe headache, or vision changes.  The patient verbalized understanding of the proper use and possible adverse effects of minocycline.  All of the patient's questions and concerns were addressed.
Rhofade Counseling: Rhofade is a topical medication which can decrease superficial blood flow where applied. Side effects are uncommon and include stinging, redness and allergic reactions.
Opioid Pregnancy And Lactation Text: These medications can lead to premature delivery and should be avoided during pregnancy. These medications are also present in breast milk in small amounts.
Niacinamide Counseling: I recommended taking niacin or niacinamide, also know as vitamin B3, twice daily. Recent evidence suggests that taking vitamin B3 (500 mg twice daily) can reduce the risk of actinic keratoses and non-melanoma skin cancers. Side effects of vitamin B3 include flushing and headache.
Infliximab Counseling:  I discussed with the patient the risks of infliximab including but not limited to myelosuppression, immunosuppression, autoimmune hepatitis, demyelinating diseases, lymphoma, and serious infections.  The patient understands that monitoring is required including a PPD at baseline and must alert us or the primary physician if symptoms of infection or other concerning signs are noted.
Xelshirleyz Pregnancy And Lactation Text: This medication is Pregnancy Category D and is not considered safe during pregnancy.  The risk during breast feeding is also uncertain.
Xolair Counseling:  Patient informed of potential adverse effects including but not limited to fever, muscle aches, rash and allergic reactions.  The patient verbalized understanding of the proper use and possible adverse effects of Xolair.  All of the patient's questions and concerns were addressed.
Acitretin Pregnancy And Lactation Text: This medication is Pregnancy Category X and should not be given to women who are pregnant or may become pregnant in the future. This medication is excreted in breast milk.
Niacinamide Pregnancy And Lactation Text: These medications are considered safe during pregnancy.
Ivermectin Counseling:  Patient instructed to take medication on an empty stomach with a full glass of water.  Patient informed of potential adverse effects including but not limited to nausea, diarrhea, dizziness, itching, and swelling of the extremities or lymph nodes.  The patient verbalized understanding of the proper use and possible adverse effects of ivermectin.  All of the patient's questions and concerns were addressed.
Bexarotene Counseling:  I discussed with the patient the risks of bexarotene including but not limited to hair loss, dry lips/skin/eyes, liver abnormalities, hyperlipidemia, pancreatitis, depression/suicidal ideation, photosensitivity, drug rash/allergic reactions, hypothyroidism, anemia, leukopenia, infection, cataracts, and teratogenicity.  Patient understands that they will need regular blood tests to check lipid profile, liver function tests, white blood cell count, thyroid function tests and pregnancy test if applicable.
Ketoconazole Pregnancy And Lactation Text: This medication is Pregnancy Category C and it isn't know if it is safe during pregnancy. It is also excreted in breast milk and breast feeding isn't recommended.
Drysol Pregnancy And Lactation Text: This medication is considered safe during pregnancy and breast feeding.
Elidel Counseling: Patient may experience a mild burning sensation during topical application. Elidel is not approved in children less than 2 years of age. There have been case reports of hematologic and skin malignancies in patients using topical calcineurin inhibitors although causality is questionable.
Terbinafine Counseling: Patient counseling regarding adverse effects of terbinafine including but not limited to headache, diarrhea, rash, upset stomach, liver function test abnormalities, itching, taste/smell disturbance, nausea, abdominal pain, and flatulence.  There is a rare possibility of liver failure that can occur when taking terbinafine.  The patient understands that a baseline LFT and kidney function test may be required. The patient verbalized understanding of the proper use and possible adverse effects of terbinafine.  All of the patient's questions and concerns were addressed.
Nsaids Counseling: NSAID Counseling: I discussed with the patient that NSAIDs should be taken with food. Prolonged use of NSAIDs can result in the development of stomach ulcers.  Patient advised to stop taking NSAIDs if abdominal pain occurs.  The patient verbalized understanding of the proper use and possible adverse effects of NSAIDs.  All of the patient's questions and concerns were addressed.
Xolair Pregnancy And Lactation Text: This medication is Pregnancy Category B and is considered safe during pregnancy. This medication is excreted in breast milk.
Tranexamic Acid Counseling:  Patient advised of the small risk of bleeding problems with tranexamic acid. They were also instructed to call if they developed any nausea, vomiting or diarrhea. All of the patient's questions and concerns were addressed.
Solaraze Counseling:  I discussed with the patient the risks of Solaraze including but not limited to erythema, scaling, itching, weeping, crusting, and pain.
Rituxan Counseling:  I discussed with the patient the risks of Rituxan infusions. Side effects can include infusion reactions, severe drug rashes including mucocutaneous reactions, reactivation of latent hepatitis and other infections and rarely progressive multifocal leukoencephalopathy.  All of the patient's questions and concerns were addressed.
Arava Counseling:  Patient counseled regarding adverse effects of Arava including but not limited to nausea, vomiting, abnormalities in liver function tests. Patients may develop mouth sores, rash, diarrhea, and abnormalities in blood counts. The patient understands that monitoring is required including LFTs and blood counts.  There is a rare possibility of scarring of the liver and lung problems that can occur when taking methotrexate. Persistent nausea, loss of appetite, pale stools, dark urine, cough, and shortness of breath should be reported immediately. Patient advised to discontinue Arava treatment and consult with a physician prior to attempting conception. The patient will have to undergo a treatment to eliminate Arava from the body prior to conception.
Bexarotene Pregnancy And Lactation Text: This medication is Pregnancy Category X and should not be given to women who are pregnant or may become pregnant. This medication should not be used if you are breast feeding.
Quinolones Counseling:  I discussed with the patient the risks of fluoroquinolones including but not limited to GI upset, allergic reaction, drug rash, diarrhea, dizziness, photosensitivity, yeast infections, liver function test abnormalities, tendonitis/tendon rupture.
Tranexamic Acid Pregnancy And Lactation Text: It is unknown if this medication is safe during pregnancy or breast feeding.
Nsaids Pregnancy And Lactation Text: These medications are considered safe up to 30 weeks gestation. It is excreted in breast milk.
Azathioprine Counseling:  I discussed with the patient the risks of azathioprine including but not limited to myelosuppression, immunosuppression, hepatotoxicity, lymphoma, and infections.  The patient understands that monitoring is required including baseline LFTs, Creatinine, possible TPMP genotyping and weekly CBCs for the first month and then every 2 weeks thereafter.  The patient verbalized understanding of the proper use and possible adverse effects of azathioprine.  All of the patient's questions and concerns were addressed.
Eucrisa Counseling: Patient may experience a mild burning sensation during topical application. Eucrisa is not approved in children less than 2 years of age.
Isotretinoin Counseling: Patient should get monthly blood tests, not donate blood, not drive at night if vision affected, not share medication, and not undergo elective surgery for 6 months after tx completed. Side effects reviewed, pt to contact office should one occur.
Azithromycin Counseling:  I discussed with the patient the risks of azithromycin including but not limited to GI upset, allergic reaction, drug rash, diarrhea, and yeast infections.
Odomzo Counseling- I discussed with the patient the risks of Odomzo including but not limited to nausea, vomiting, diarrhea, constipation, weight loss, changes in the sense of taste, decreased appetite, muscle spasms, and hair loss.  The patient verbalized understanding of the proper use and possible adverse effects of Odomzo.  All of the patient's questions and concerns were addressed.
Solaraze Pregnancy And Lactation Text: This medication is Pregnancy Category B and is considered safe. There is some data to suggest avoiding during the third trimester. It is unknown if this medication is excreted in breast milk.
Rituxan Pregnancy And Lactation Text: This medication is Pregnancy Category C and it isn't know if it is safe during pregnancy. It is unknown if this medication is excreted in breast milk but similar antibodies are known to be excreted.
Valtrex Counseling: I discussed with the patient the risks of valacyclovir including but not limited to kidney damage, nausea, vomiting and severe allergy.  The patient understands that if the infection seems to be worsening or is not improving, they are to call.
Topical Retinoid counseling:  Patient advised to apply a pea-sized amount only at bedtime and wait 30 minutes after washing their face before applying.  If too drying, patient may add a non-comedogenic moisturizer. The patient verbalized understanding of the proper use and possible adverse effects of retinoids.  All of the patient's questions and concerns were addressed.
Rifampin Counseling: I discussed with the patient the risks of rifampin including but not limited to liver damage, kidney damage, red-orange body fluids, nausea/vomiting and severe allergy.
Siliq Counseling:  I discussed with the patient the risks of Siliq including but not limited to new or worsening depression, suicidal thoughts and behavior, immunosuppression, malignancy, posterior leukoencephalopathy syndrome, and serious infections.  The patient understands that monitoring is required including a PPD at baseline and must alert us or the primary physician if symptoms of infection or other concerning signs are noted. There is also a special program designed to monitor depression which is required with Siliq.
Clofazimine Counseling:  I discussed with the patient the risks of clofazimine including but not limited to skin and eye pigmentation, liver damage, nausea/vomiting, gastrointestinal bleeding and allergy.
Valtrex Pregnancy And Lactation Text: this medication is Pregnancy Category B and is considered safe during pregnancy. This medication is not directly found in breast milk but it's metabolite acyclovir is present.
Isotretinoin Pregnancy And Lactation Text: This medication is Pregnancy Category X and is considered extremely dangerous during pregnancy. It is unknown if it is excreted in breast milk.
Azithromycin Pregnancy And Lactation Text: This medication is considered safe during pregnancy and is also secreted in breast milk.
Rifampin Pregnancy And Lactation Text: This medication is Pregnancy Category C and it isn't know if it is safe during pregnancy. It is also excreted in breast milk and should not be used if you are breast feeding.
Cimetidine Counseling:  I discussed with the patient the risks of Cimetidine including but not limited to gynecomastia, headache, diarrhea, nausea, drowsiness, arrhythmias, pancreatitis, skin rashes, psychosis, bone marrow suppression and kidney toxicity.
Bactrim Counseling:  I discussed with the patient the risks of sulfa antibiotics including but not limited to GI upset, allergic reaction, drug rash, diarrhea, dizziness, photosensitivity, and yeast infections.  Rarely, more serious reactions can occur including but not limited to aplastic anemia, agranulocytosis, methemoglobinemia, blood dyscrasias, liver or kidney failure, lung infiltrates or desquamative/blistering drug rashes.
Cellcept Counseling:  I discussed with the patient the risks of mycophenolate mofetil including but not limited to infection/immunosuppression, GI upset, hypokalemia, hypercholesterolemia, bone marrow suppression, lymphoproliferative disorders, malignancy, GI ulceration/bleed/perforation, colitis, interstitial lung disease, kidney failure, progressive multifocal leukoencephalopathy, and birth defects.  The patient understands that monitoring is required including a baseline creatinine and regular CBC testing. In addition, patient must alert us immediately if symptoms of infection or other concerning signs are noted.
High Dose Vitamin A Counseling: Side effects reviewed, pt to contact office should one occur.
Otezla Counseling: The side effects of Otezla were discussed with the patient, including but not limited to worsening or new depression, weight loss, diarrhea, nausea, upper respiratory tract infection, and headache. Patient instructed to call the office should any adverse effect occur.  The patient verbalized understanding of the proper use and possible adverse effects of Otezla.  All the patient's questions and concerns were addressed.
Cimzia Counseling:  I discussed with the patient the risks of Cimzia including but not limited to immunosuppression, allergic reactions and infections.  The patient understands that monitoring is required including a PPD at baseline and must alert us or the primary physician if symptoms of infection or other concerning signs are noted.

## 2021-05-20 NOTE — HPI: HIVES (URTICARIA)
How Severe Are Your Hives?: mild
Please Select The Phrase That Best Describes Your Hives.: individual welts stay in the same place for more than 24 hours
Is This A New Presentation, Or A Follow-Up?: Hives
Additional History: Pt experienced hives as a child that had since resolved. He notes new onset hives 3 months ago, denies facial/throat/tongue/lip swelling, or difficulty breathing. He does not have any new medications. He was seen by an allergist recently who advised him to check with his pcp and cardiologist regarding discontinuation of daily aspirin. He notes the hives are worse at night and he notes that areas with pressure, such as his waistband, feature the most persistent lesions. He states that this issue presented prior to his covid vaccination.

## 2021-05-20 NOTE — PROCEDURE: ADDITIONAL NOTES
Render Risk Assessment In Note?: no
Detail Level: Detailed
Additional Notes: Recommended discussing with PCP and Cardiologist alternative to Aspirin. \\nd/c aspirin if okay with cardio. He does not take any nsaids. \\nPatient states that hives appear on areas where there is friction such as waist, arms and legs. \\nRecommended wearing loose fitted clothing to avoid hives. \\nAdvised to increase to taking 2 allegra in the morning, 2 zyrtec or claritan in the afternoon, with two Benadryl at night. Pt may also continue taking daily famotidine. \\nRecommended prescribing topical steroid cream for itch. \\nWill prescribe TAC and send Rx to pharmacy. \\nExplained in detail how to apply topical steroid cream twice daily [id number]. \\nDiscussed treatment and risks with patient.\\nAdvised pt than any degree of swelling on the lips, face, tongue, throat, or difficulty breathing necessitate dialing 911. \\nWill follow up in 2 weeks
Additional Notes: Discussed in detail diagnosis and treatments such as anti-fungal. \\nWill prescribe Ketoconazole and send Rx to pharmacy.  \\nExplained in detail how to apply topical anti-fungal twice daily. \\nDiscussed treatment and risks with patient.
Additional Notes: Discussed in detail treatment options such as oral medication such as Lamisil but discussed long term liver damage and 50% of condition recurrent. \\nRecommended using Ciclopirox nail polish on the toenails x6 months-1 year. \\nDiscussed treatment and risks with patient.

## 2021-06-14 ENCOUNTER — APPOINTMENT (RX ONLY)
Dept: URBAN - METROPOLITAN AREA CLINIC 31 | Facility: CLINIC | Age: 56
Setting detail: DERMATOLOGY
End: 2021-06-14

## 2021-06-14 DIAGNOSIS — L57.0 ACTINIC KERATOSIS: ICD-10-CM

## 2021-06-14 DIAGNOSIS — B35.1 TINEA UNGUIUM: ICD-10-CM | Status: STABLE

## 2021-06-14 DIAGNOSIS — L81.4 OTHER MELANIN HYPERPIGMENTATION: ICD-10-CM

## 2021-06-14 DIAGNOSIS — D22 MELANOCYTIC NEVI: ICD-10-CM

## 2021-06-14 DIAGNOSIS — L50.1 IDIOPATHIC URTICARIA: ICD-10-CM | Status: RESOLVED

## 2021-06-14 DIAGNOSIS — D18.0 HEMANGIOMA: ICD-10-CM

## 2021-06-14 DIAGNOSIS — Z71.89 OTHER SPECIFIED COUNSELING: ICD-10-CM

## 2021-06-14 DIAGNOSIS — B35.3 TINEA PEDIS: ICD-10-CM | Status: IMPROVED

## 2021-06-14 DIAGNOSIS — L82.1 OTHER SEBORRHEIC KERATOSIS: ICD-10-CM

## 2021-06-14 PROBLEM — D22.5 MELANOCYTIC NEVI OF TRUNK: Status: ACTIVE | Noted: 2021-06-14

## 2021-06-14 PROBLEM — D18.01 HEMANGIOMA OF SKIN AND SUBCUTANEOUS TISSUE: Status: ACTIVE | Noted: 2021-06-14

## 2021-06-14 PROBLEM — D22.71 MELANOCYTIC NEVI OF RIGHT LOWER LIMB, INCLUDING HIP: Status: ACTIVE | Noted: 2021-06-14

## 2021-06-14 PROBLEM — D22.62 MELANOCYTIC NEVI OF LEFT UPPER LIMB, INCLUDING SHOULDER: Status: ACTIVE | Noted: 2021-06-14

## 2021-06-14 PROBLEM — D22.61 MELANOCYTIC NEVI OF RIGHT UPPER LIMB, INCLUDING SHOULDER: Status: ACTIVE | Noted: 2021-06-14

## 2021-06-14 PROBLEM — L30.9 DERMATITIS, UNSPECIFIED: Status: ACTIVE | Noted: 2021-06-14

## 2021-06-14 PROBLEM — D29.4 BENIGN NEOPLASM OF SCROTUM: Status: ACTIVE | Noted: 2021-06-14

## 2021-06-14 PROBLEM — D22.72 MELANOCYTIC NEVI OF LEFT LOWER LIMB, INCLUDING HIP: Status: ACTIVE | Noted: 2021-06-14

## 2021-06-14 PROCEDURE — ? LIQUID NITROGEN

## 2021-06-14 PROCEDURE — 99213 OFFICE O/P EST LOW 20 MIN: CPT | Mod: 25

## 2021-06-14 PROCEDURE — 17003 DESTRUCT PREMALG LES 2-14: CPT

## 2021-06-14 PROCEDURE — ? COUNSELING

## 2021-06-14 PROCEDURE — ? ADDITIONAL NOTES

## 2021-06-14 PROCEDURE — 17000 DESTRUCT PREMALG LESION: CPT

## 2021-06-14 ASSESSMENT — LOCATION DETAILED DESCRIPTION DERM
LOCATION DETAILED: LEFT ANTERIOR DISTAL UPPER ARM
LOCATION DETAILED: RIGHT MEDIAL 2ND TOE
LOCATION DETAILED: INFERIOR THORACIC SPINE
LOCATION DETAILED: LEFT POSTERIOR SHOULDER
LOCATION DETAILED: RIGHT DISTAL PLANTAR GREAT TOE
LOCATION DETAILED: 1ST WEBSPACE LEFT FOOT
LOCATION DETAILED: RIGHT INFERIOR MEDIAL UPPER BACK
LOCATION DETAILED: RIGHT PROXIMAL CALF
LOCATION DETAILED: LEFT PROXIMAL DORSAL FOREARM
LOCATION DETAILED: RIGHT VENTRAL DISTAL FOREARM
LOCATION DETAILED: RIGHT PLANTAR FOREFOOT OVERLYING 2ND METATARSAL
LOCATION DETAILED: PERIUMBILICAL SKIN
LOCATION DETAILED: LEFT PROXIMAL POSTERIOR UPPER ARM
LOCATION DETAILED: RIGHT ANTERIOR DISTAL UPPER ARM
LOCATION DETAILED: NASAL DORSUM
LOCATION DETAILED: RIGHT ULNAR DORSAL HAND
LOCATION DETAILED: LEFT GREAT TOENAIL
LOCATION DETAILED: LEFT VENTRAL DISTAL FOREARM
LOCATION DETAILED: LEFT LATERAL MALAR CHEEK
LOCATION DETAILED: LEFT ANTERIOR DISTAL THIGH
LOCATION DETAILED: LEFT PLANTAR FOREFOOT OVERLYING 3RD METATARSAL
LOCATION DETAILED: RIGHT PROXIMAL POSTERIOR UPPER ARM
LOCATION DETAILED: LEFT PROXIMAL CALF
LOCATION DETAILED: EPIGASTRIC SKIN
LOCATION DETAILED: RIGHT SUPERIOR MEDIAL MIDBACK
LOCATION DETAILED: RIGHT ANTERIOR DISTAL THIGH
LOCATION DETAILED: RIGHT POSTERIOR SHOULDER
LOCATION DETAILED: RIGHT PROXIMAL DORSAL FOREARM
LOCATION DETAILED: LEFT RADIAL DORSAL HAND
LOCATION DETAILED: RIGHT CENTRAL MALAR CHEEK

## 2021-06-14 ASSESSMENT — LOCATION ZONE DERM
LOCATION ZONE: NOSE
LOCATION ZONE: FEET
LOCATION ZONE: FACE
LOCATION ZONE: TOENAIL
LOCATION ZONE: ARM
LOCATION ZONE: HAND
LOCATION ZONE: TRUNK
LOCATION ZONE: LEG
LOCATION ZONE: TOE

## 2021-06-14 ASSESSMENT — LOCATION SIMPLE DESCRIPTION DERM
LOCATION SIMPLE: LEFT SHOULDER
LOCATION SIMPLE: RIGHT LOWER BACK
LOCATION SIMPLE: NOSE
LOCATION SIMPLE: UPPER BACK
LOCATION SIMPLE: LEFT FOREARM
LOCATION SIMPLE: LEFT CALF
LOCATION SIMPLE: RIGHT FOREARM
LOCATION SIMPLE: RIGHT GREAT TOE
LOCATION SIMPLE: ABDOMEN
LOCATION SIMPLE: RIGHT HAND
LOCATION SIMPLE: RIGHT CHEEK
LOCATION SIMPLE: RIGHT PLANTAR SURFACE
LOCATION SIMPLE: LEFT GREAT TOE
LOCATION SIMPLE: LEFT HAND
LOCATION SIMPLE: LEFT CHEEK
LOCATION SIMPLE: RIGHT CALF
LOCATION SIMPLE: LEFT UPPER ARM
LOCATION SIMPLE: LEFT PLANTAR SURFACE
LOCATION SIMPLE: RIGHT 2ND TOE
LOCATION SIMPLE: LEFT THIGH
LOCATION SIMPLE: RIGHT THIGH
LOCATION SIMPLE: RIGHT UPPER ARM
LOCATION SIMPLE: RIGHT SHOULDER
LOCATION SIMPLE: RIGHT UPPER BACK
LOCATION SIMPLE: LEFT FOOT

## 2021-06-14 NOTE — PROCEDURE: ADDITIONAL NOTES
Additional Notes: Continue regimen of Ketoconazole. Use as directed. \\nDiscussed treatment and risks with patient.
Render Risk Assessment In Note?: no
Detail Level: Detailed
Additional Notes: Continue regimen of Ciclopirox as directed. \\nDiscussed treatment and risks with patient.
Additional Notes: Discussed treatment and risks with patient. \\nDiscussed in detail benign diagnosis and cosmetic treatments. \\[employer] pamphlet given to patient with cosmetic coordinators contact information.

## 2024-06-12 ENCOUNTER — APPOINTMENT (RX ONLY)
Dept: URBAN - METROPOLITAN AREA CLINIC 4 | Facility: CLINIC | Age: 59
Setting detail: DERMATOLOGY
End: 2024-06-12

## 2024-06-12 DIAGNOSIS — L82.0 INFLAMED SEBORRHEIC KERATOSIS: ICD-10-CM

## 2024-06-12 DIAGNOSIS — L57.8 OTHER SKIN CHANGES DUE TO CHRONIC EXPOSURE TO NONIONIZING RADIATION: ICD-10-CM

## 2024-06-12 DIAGNOSIS — L57.0 ACTINIC KERATOSIS: ICD-10-CM

## 2024-06-12 DIAGNOSIS — L81.4 OTHER MELANIN HYPERPIGMENTATION: ICD-10-CM

## 2024-06-12 DIAGNOSIS — B35.1 TINEA UNGUIUM: ICD-10-CM | Status: INADEQUATELY CONTROLLED

## 2024-06-12 DIAGNOSIS — L82.1 OTHER SEBORRHEIC KERATOSIS: ICD-10-CM

## 2024-06-12 DIAGNOSIS — D22 MELANOCYTIC NEVI: ICD-10-CM

## 2024-06-12 DIAGNOSIS — L91.8 OTHER HYPERTROPHIC DISORDERS OF THE SKIN: ICD-10-CM

## 2024-06-12 DIAGNOSIS — D18.0 HEMANGIOMA: ICD-10-CM

## 2024-06-12 DIAGNOSIS — D485 NEOPLASM OF UNCERTAIN BEHAVIOR OF SKIN: ICD-10-CM

## 2024-06-12 PROBLEM — D22.5 MELANOCYTIC NEVI OF TRUNK: Status: ACTIVE | Noted: 2024-06-12

## 2024-06-12 PROBLEM — D48.5 NEOPLASM OF UNCERTAIN BEHAVIOR OF SKIN: Status: ACTIVE | Noted: 2024-06-12

## 2024-06-12 PROBLEM — D18.01 HEMANGIOMA OF SKIN AND SUBCUTANEOUS TISSUE: Status: ACTIVE | Noted: 2024-06-12

## 2024-06-12 PROCEDURE — ? PRESCRIPTION

## 2024-06-12 PROCEDURE — 11102 TANGNTL BX SKIN SINGLE LES: CPT | Mod: 59

## 2024-06-12 PROCEDURE — 17000 DESTRUCT PREMALG LESION: CPT | Mod: 59

## 2024-06-12 PROCEDURE — ? COUNSELING

## 2024-06-12 PROCEDURE — 17110 DESTRUCTION B9 LES UP TO 14: CPT

## 2024-06-12 PROCEDURE — ? LIQUID NITROGEN

## 2024-06-12 PROCEDURE — ? PHOTO-DOCUMENTATION

## 2024-06-12 PROCEDURE — ? BIOPSY BY SHAVE METHOD

## 2024-06-12 PROCEDURE — ? ADDITIONAL NOTES

## 2024-06-12 PROCEDURE — ? OBSERVATION

## 2024-06-12 PROCEDURE — 99213 OFFICE O/P EST LOW 20 MIN: CPT | Mod: 25

## 2024-06-12 RX ORDER — CICLOPIROX 80 MG/ML
1 SOLUTION TOPICAL QD
Qty: 6.6 | Refills: 6 | Status: ERX | COMMUNITY
Start: 2024-06-12

## 2024-06-12 RX ADMIN — CICLOPIROX 1: 80 SOLUTION TOPICAL at 00:00

## 2024-06-12 ASSESSMENT — LOCATION DETAILED DESCRIPTION DERM
LOCATION DETAILED: LEFT ANTERIOR DISTAL THIGH
LOCATION DETAILED: LEFT INFERIOR CENTRAL MALAR CHEEK
LOCATION DETAILED: RIGHT INFERIOR LATERAL LOWER BACK
LOCATION DETAILED: RIGHT SUPERIOR CENTRAL MALAR CHEEK
LOCATION DETAILED: LEFT DISTAL DORSAL FOREARM
LOCATION DETAILED: RIGHT PROXIMAL DORSAL FOREARM
LOCATION DETAILED: LEFT MEDIAL SUPERIOR CHEST
LOCATION DETAILED: LEFT ANTERIOR PROXIMAL UPPER ARM
LOCATION DETAILED: RIGHT DISTAL PLANTAR GREAT TOE
LOCATION DETAILED: RIGHT SUPERIOR MEDIAL UPPER BACK
LOCATION DETAILED: RIGHT CENTRAL MALAR CHEEK
LOCATION DETAILED: RIGHT CENTRAL FRONTAL SCALP
LOCATION DETAILED: RIGHT DISTAL DORSAL FOREARM
LOCATION DETAILED: RIGHT ANTERIOR PROXIMAL UPPER ARM
LOCATION DETAILED: LEFT DORSAL WRIST
LOCATION DETAILED: RIGHT ANTERIOR DISTAL THIGH
LOCATION DETAILED: RIGHT MID-UPPER BACK
LOCATION DETAILED: LEFT GREAT TOENAIL
LOCATION DETAILED: RIGHT INFERIOR UPPER BACK

## 2024-06-12 ASSESSMENT — LOCATION ZONE DERM
LOCATION ZONE: LEG
LOCATION ZONE: TOE
LOCATION ZONE: ARM
LOCATION ZONE: FACE
LOCATION ZONE: TOENAIL
LOCATION ZONE: SCALP
LOCATION ZONE: TRUNK

## 2024-06-12 ASSESSMENT — LOCATION SIMPLE DESCRIPTION DERM
LOCATION SIMPLE: LEFT WRIST
LOCATION SIMPLE: LEFT THIGH
LOCATION SIMPLE: CHEST
LOCATION SIMPLE: LEFT FOREARM
LOCATION SIMPLE: RIGHT FOREARM
LOCATION SIMPLE: LEFT UPPER ARM
LOCATION SIMPLE: LEFT GREAT TOE
LOCATION SIMPLE: RIGHT LOWER BACK
LOCATION SIMPLE: RIGHT SCALP
LOCATION SIMPLE: RIGHT THIGH
LOCATION SIMPLE: RIGHT UPPER ARM
LOCATION SIMPLE: RIGHT CHEEK
LOCATION SIMPLE: RIGHT GREAT TOE
LOCATION SIMPLE: RIGHT UPPER BACK
LOCATION SIMPLE: LEFT CHEEK

## 2024-06-12 NOTE — PROCEDURE: ADDITIONAL NOTES
Additional Notes: Discussed in detail treatment options such as oral medication such as Lamisil but discussed long term liver damage and 50% of condition recurrent. \\nRecommended using Ciclopirox nail polish on the toenails x6 months-1 year. \\nDiscussed treatment and risks with patient.
Detail Level: Detailed
Render Risk Assessment In Note?: no
Patient/Caregiver provided printed discharge information.

## 2024-06-12 NOTE — PROCEDURE: BIOPSY BY SHAVE METHOD

## 2024-06-12 NOTE — PROCEDURE: LIQUID NITROGEN
Render Note In Bullet Format When Appropriate: No
Post-Care Instructions: I reviewed with the patient in detail post-care instructions. Patient is to wear sunprotection, and avoid picking at any of the treated lesions. Pt may apply Vaseline to crusted or scabbing areas.
Duration Of Freeze Thaw-Cycle (Seconds): 3
Detail Level: Simple
Consent: The patient's verbal consent was obtained including but not limited to risks of crusting, scabbing, blistering, scarring, darker or lighter pigmentary change, recurrence, incomplete removal and infection.
Number Of Freeze-Thaw Cycles: 2 freeze-thaw cycles
Show Aperture Variable?: Yes
Aperture Size (Optional): C
Medical Necessity Clause: This procedure was medically necessary because the lesions that were treated were:
Medical Necessity Information: It is in your best interest to select a reason for this procedure from the list below. All of these items fulfill various CMS LCD requirements except the new and changing color options.
Detail Level: Detailed
Number Of Freeze-Thaw Cycles: 1 freeze-thaw cycle
Spray Paint Text: The liquid nitrogen was applied to the skin utilizing a spray paint frosting technique.

## (undated) DEVICE — TUBING CLEARLINK DUO-VENT - C-FLO (48EA/CA)

## (undated) DEVICE — GLOVE BIOGEL INDICATOR SZ 8.5 SURGICAL PF LTX - (50/BX 4BX/CA)

## (undated) DEVICE — PACK MINOR BASIN - (2EA/CA)

## (undated) DEVICE — BLOCK

## (undated) DEVICE — ELECTRODE DUAL RETURN W/ CORD - (50/PK)

## (undated) DEVICE — SUTURE 0 VICRYL PLUS CT-1 - 36 INCH (36/BX)

## (undated) DEVICE — PROTECTOR ULNA NERVE - (36PR/CA)

## (undated) DEVICE — SUTURE 3-0 VICRYL PLUS SH - 8X 18 INCH (12/BX)

## (undated) DEVICE — BANDAGE ROLL STERILE BULKEE 4.5 IN X 4 YD (100EA/CA)

## (undated) DEVICE — SUTURE 3-0 VICRYL PLUS SH - 27 INCH (36/BX)

## (undated) DEVICE — SUTURE 2-0 ETHILON FS - (36/BX) 18 INCH

## (undated) DEVICE — GLOVE SZ 7 BIOGEL PI MICRO - PF LF (50PR/BX 4BX/CA)

## (undated) DEVICE — GLOVE BIOGEL PI INDICATOR SZ 7.0 SURGICAL PF LF - (50/BX 4BX/CA)

## (undated) DEVICE — SYRINGE 10 ML CONTROL LL (25EA/BX 4BX/CA)

## (undated) DEVICE — SENSOR SPO2 NEO LNCS ADHESIVE (20/BX) SEE USER NOTES

## (undated) DEVICE — HEAD HOLDER JUNIOR/ADULT

## (undated) DEVICE — CANISTER SUCTION 3000ML MECHANICAL FILTER AUTO SHUTOFF MEDI-VAC NONSTERILE LF DISP  (40EA/CA)

## (undated) DEVICE — NEPTUNE 4 PORT MANIFOLD - (20/PK)

## (undated) DEVICE — KIT ROOM DECONTAMINATION

## (undated) DEVICE — RESERVOIR SUCTION 100 CC - SILICONE (20EA/CA)

## (undated) DEVICE — ELECTRODE 850 FOAM ADHESIVE - HYDROGEL RADIOTRNSPRNT (50/PK)

## (undated) DEVICE — GLOVE BIOGEL SZ 8 SURGICAL PF LTX - (50PR/BX 4BX/CA)

## (undated) DEVICE — MASK ANESTHESIA ADULT  - (100/CA)

## (undated) DEVICE — SUTURE 2-0 VICRYL PLUS CT-1 36 (36PK/BX)"

## (undated) DEVICE — CHLORAPREP 26 ML APPLICATOR - ORANGE TINT(25/CA)

## (undated) DEVICE — SODIUM CHL IRRIGATION 0.9% 1000ML (12EA/CA)

## (undated) DEVICE — SUTURE GENERAL

## (undated) DEVICE — SET LEADWIRE 5 LEAD BEDSIDE DISPOSABLE ECG (1SET OF 5/EA)

## (undated) DEVICE — KIT ANESTHESIA W/CIRCUIT & 3/LT BAG W/FILTER (20EA/CA)

## (undated) DEVICE — BLADE SURGICAL #15 - (50/BX 3BX/CA)

## (undated) DEVICE — SUCTION INSTRUMENT YANKAUER BULBOUS TIP W/O VENT (50EA/CA)

## (undated) DEVICE — GOWN WARMING STANDARD FLEX - (30/CA)

## (undated) DEVICE — SUTURE 0 COATED VICRYL 6-18IN - (12PK/BX)

## (undated) DEVICE — GLOVE BIOGEL PI INDICATOR SZ 7.5 SURGICAL PF LF -(50/BX 4BX/CA)

## (undated) DEVICE — STAPLER SKIN DISP - (6/BX 10BX/CA) VISISTAT

## (undated) DEVICE — DRESSING TRANSPARENT FILM TEGADERM 4 X 4.75" (50EA/BX)"

## (undated) DEVICE — GLOVE BIOGEL PI ORTHO SZ 7 PF LF (40PR/BX)

## (undated) DEVICE — DRAPE LAPAROTOMY T SHEET - (12EA/CA)

## (undated) DEVICE — SET EXTENSION WITH 2 PORTS (48EA/CA) ***PART #2C8610 IS A SUBSTITUTE*****

## (undated) DEVICE — SPONGE GAUZESTER 4 X 4 4PLY - (128PK/CA)

## (undated) DEVICE — SLEEVE, VASO, THIGH, MED

## (undated) DEVICE — LACTATED RINGERS INJ 1000 ML - (14EA/CA 60CA/PF)